# Patient Record
Sex: MALE | Race: OTHER | HISPANIC OR LATINO | URBAN - METROPOLITAN AREA
[De-identification: names, ages, dates, MRNs, and addresses within clinical notes are randomized per-mention and may not be internally consistent; named-entity substitution may affect disease eponyms.]

---

## 2017-09-21 ENCOUNTER — OUTPATIENT (OUTPATIENT)
Dept: OUTPATIENT SERVICES | Facility: HOSPITAL | Age: 62
LOS: 1 days | End: 2017-09-21
Payer: COMMERCIAL

## 2017-09-21 DIAGNOSIS — Z98.89 OTHER SPECIFIED POSTPROCEDURAL STATES: Chronic | ICD-10-CM

## 2017-09-21 PROCEDURE — 78815 PET IMAGE W/CT SKULL-THIGH: CPT

## 2017-09-21 PROCEDURE — 78815 PET IMAGE W/CT SKULL-THIGH: CPT | Mod: 26

## 2017-09-21 PROCEDURE — A9552: CPT

## 2018-06-27 ENCOUNTER — APPOINTMENT (OUTPATIENT)
Dept: NEUROLOGY | Facility: CLINIC | Age: 63
End: 2018-06-27
Payer: COMMERCIAL

## 2018-06-27 VITALS
SYSTOLIC BLOOD PRESSURE: 130 MMHG | DIASTOLIC BLOOD PRESSURE: 83 MMHG | HEART RATE: 61 BPM | OXYGEN SATURATION: 96 % | HEIGHT: 67 IN

## 2018-06-27 VITALS — BODY MASS INDEX: 31.17 KG/M2 | WEIGHT: 199 LBS

## 2018-06-27 PROCEDURE — 99203 OFFICE O/P NEW LOW 30 MIN: CPT

## 2018-06-27 RX ORDER — ALBUTEROL SULFATE 90 UG/1
108 (90 BASE) AEROSOL, METERED RESPIRATORY (INHALATION)
Qty: 18 | Refills: 0 | Status: ACTIVE | COMMUNITY
Start: 2018-02-09

## 2018-06-27 RX ORDER — TAMSULOSIN HYDROCHLORIDE 0.4 MG/1
0.4 CAPSULE ORAL
Qty: 90 | Refills: 0 | Status: ACTIVE | COMMUNITY
Start: 2018-02-21

## 2018-09-12 ENCOUNTER — LABORATORY RESULT (OUTPATIENT)
Age: 63
End: 2018-09-12

## 2018-09-12 ENCOUNTER — APPOINTMENT (OUTPATIENT)
Dept: NEUROLOGY | Facility: CLINIC | Age: 63
End: 2018-09-12
Payer: COMMERCIAL

## 2018-09-12 VITALS
DIASTOLIC BLOOD PRESSURE: 80 MMHG | SYSTOLIC BLOOD PRESSURE: 129 MMHG | HEART RATE: 63 BPM | HEIGHT: 67 IN | WEIGHT: 199 LBS | BODY MASS INDEX: 31.23 KG/M2 | OXYGEN SATURATION: 95 %

## 2018-09-12 DIAGNOSIS — R29.2 ABNORMAL REFLEX: ICD-10-CM

## 2018-09-12 PROCEDURE — 99214 OFFICE O/P EST MOD 30 MIN: CPT

## 2018-09-13 LAB
ALBUMIN MFR SERPL ELPH: 60.4 %
ALBUMIN SERPL-MCNC: 4 G/DL
ALBUMIN/GLOB SERPL: 1.5 RATIO
ALPHA1 GLOB MFR SERPL ELPH: 4.6 %
ALPHA1 GLOB SERPL ELPH-MCNC: 0.3 G/DL
ALPHA2 GLOB MFR SERPL ELPH: 9.8 %
ALPHA2 GLOB SERPL ELPH-MCNC: 0.6 G/DL
B-GLOBULIN MFR SERPL ELPH: 11.4 %
B-GLOBULIN SERPL ELPH-MCNC: 0.8 G/DL
ENA SS-A AB SER IA-ACNC: <0.2 AL
ENA SS-B AB SER IA-ACNC: <0.2 AL
ERYTHROCYTE [SEDIMENTATION RATE] IN BLOOD BY WESTERGREN METHOD: 7 MM/HR
FOLATE SERPL-MCNC: 18.8 NG/ML
GAMMA GLOB FLD ELPH-MCNC: 0.9 G/DL
GAMMA GLOB MFR SERPL ELPH: 13.8 %
INTERPRETATION SERPL IEP-IMP: NORMAL
M PROTEIN SPEC IFE-MCNC: NORMAL
PROT SERPL-MCNC: 6.6 G/DL
PROT SERPL-MCNC: 6.6 G/DL
VIT B12 SERPL-MCNC: 449 PG/ML

## 2018-09-14 LAB — HTLV I+II AB SER QL: NORMAL

## 2018-09-15 LAB
COPPER SERPL-MCNC: 124 UG/DL
ZINC SERPL-MCNC: 91 UG/DL

## 2018-09-16 LAB
A-TOCOPHEROL VIT E SERPL-MCNC: 8 MG/L
BETA+GAMMA TOCOPHEROL SERPL-MCNC: 0.7 MG/L

## 2018-09-18 LAB
HOMOCYSTEINE LEVEL: 17.9 UMOL/L
METHYLMALONIC ACID LEVEL: 183 NMOL/L

## 2018-09-26 ENCOUNTER — APPOINTMENT (OUTPATIENT)
Dept: MRI IMAGING | Facility: CLINIC | Age: 63
End: 2018-09-26
Payer: COMMERCIAL

## 2018-09-26 ENCOUNTER — OUTPATIENT (OUTPATIENT)
Dept: OUTPATIENT SERVICES | Facility: HOSPITAL | Age: 63
LOS: 1 days | End: 2018-09-26
Payer: COMMERCIAL

## 2018-09-26 DIAGNOSIS — Z98.89 OTHER SPECIFIED POSTPROCEDURAL STATES: Chronic | ICD-10-CM

## 2018-09-26 PROCEDURE — 72146 MRI CHEST SPINE W/O DYE: CPT | Mod: 26

## 2018-09-26 PROCEDURE — 72141 MRI NECK SPINE W/O DYE: CPT

## 2018-09-26 PROCEDURE — 72141 MRI NECK SPINE W/O DYE: CPT | Mod: 26

## 2018-09-26 PROCEDURE — 72146 MRI CHEST SPINE W/O DYE: CPT

## 2018-11-30 ENCOUNTER — APPOINTMENT (OUTPATIENT)
Dept: NEUROLOGY | Facility: CLINIC | Age: 63
End: 2018-11-30
Payer: COMMERCIAL

## 2018-11-30 VITALS
WEIGHT: 199 LBS | HEIGHT: 67 IN | BODY MASS INDEX: 31.23 KG/M2 | HEART RATE: 54 BPM | SYSTOLIC BLOOD PRESSURE: 126 MMHG | OXYGEN SATURATION: 96 % | DIASTOLIC BLOOD PRESSURE: 78 MMHG

## 2018-11-30 DIAGNOSIS — R25.2 CRAMP AND SPASM: ICD-10-CM

## 2018-11-30 DIAGNOSIS — M54.9 DORSALGIA, UNSPECIFIED: ICD-10-CM

## 2018-11-30 PROCEDURE — 95912 NRV CNDJ TEST 11-12 STUDIES: CPT

## 2018-11-30 PROCEDURE — 99214 OFFICE O/P EST MOD 30 MIN: CPT | Mod: 25

## 2018-11-30 PROCEDURE — 95885 MUSC TST DONE W/NERV TST LIM: CPT | Mod: 59

## 2019-01-06 ENCOUNTER — FORM ENCOUNTER (OUTPATIENT)
Age: 64
End: 2019-01-06

## 2019-01-07 ENCOUNTER — OUTPATIENT (OUTPATIENT)
Dept: OUTPATIENT SERVICES | Facility: HOSPITAL | Age: 64
LOS: 1 days | End: 2019-01-07
Payer: COMMERCIAL

## 2019-01-07 ENCOUNTER — APPOINTMENT (OUTPATIENT)
Dept: ORTHOPEDIC SURGERY | Facility: CLINIC | Age: 64
End: 2019-01-07
Payer: COMMERCIAL

## 2019-01-07 VITALS
HEIGHT: 67 IN | DIASTOLIC BLOOD PRESSURE: 80 MMHG | HEART RATE: 60 BPM | WEIGHT: 199 LBS | OXYGEN SATURATION: 97 % | SYSTOLIC BLOOD PRESSURE: 124 MMHG | BODY MASS INDEX: 31.23 KG/M2

## 2019-01-07 DIAGNOSIS — M67.911 UNSPECIFIED DISORDER OF SYNOVIUM AND TENDON, RIGHT SHOULDER: ICD-10-CM

## 2019-01-07 DIAGNOSIS — Z98.89 OTHER SPECIFIED POSTPROCEDURAL STATES: Chronic | ICD-10-CM

## 2019-01-07 DIAGNOSIS — M67.912 UNSPECIFIED DISORDER OF SYNOVIUM AND TENDON, RIGHT SHOULDER: ICD-10-CM

## 2019-01-07 PROCEDURE — 73030 X-RAY EXAM OF SHOULDER: CPT

## 2019-01-07 PROCEDURE — 73030 X-RAY EXAM OF SHOULDER: CPT | Mod: 26,50

## 2019-01-07 PROCEDURE — 99203 OFFICE O/P NEW LOW 30 MIN: CPT

## 2020-10-13 ENCOUNTER — APPOINTMENT (OUTPATIENT)
Age: 65
End: 2020-10-13
Payer: COMMERCIAL

## 2020-10-13 DIAGNOSIS — K86.9 DISEASE OF PANCREAS, UNSPECIFIED: ICD-10-CM

## 2020-10-13 DIAGNOSIS — R93.2 ABNORMAL FINDINGS ON DIAGNOSTIC IMAGING OF LIVER AND BILIARY TRACT: ICD-10-CM

## 2020-10-13 PROCEDURE — 99203 OFFICE O/P NEW LOW 30 MIN: CPT | Mod: 95

## 2020-10-14 ENCOUNTER — RESULT REVIEW (OUTPATIENT)
Age: 65
End: 2020-10-14

## 2020-10-14 ENCOUNTER — APPOINTMENT (OUTPATIENT)
Age: 65
End: 2020-10-14

## 2020-10-14 ENCOUNTER — OUTPATIENT (OUTPATIENT)
Dept: OUTPATIENT SERVICES | Facility: HOSPITAL | Age: 65
LOS: 1 days | Discharge: HOME | End: 2020-10-14
Payer: COMMERCIAL

## 2020-10-14 DIAGNOSIS — Z98.89 OTHER SPECIFIED POSTPROCEDURAL STATES: Chronic | ICD-10-CM

## 2020-10-14 PROBLEM — R93.2 ABNORMAL CT OF LIVER: Status: ACTIVE | Noted: 2020-10-14

## 2020-10-14 PROCEDURE — 88305 TISSUE EXAM BY PATHOLOGIST: CPT

## 2020-10-14 PROCEDURE — 88305 TISSUE EXAM BY PATHOLOGIST: CPT | Mod: 26

## 2020-10-14 PROCEDURE — 88173 CYTOPATH EVAL FNA REPORT: CPT

## 2020-10-14 PROCEDURE — 88341 IMHCHEM/IMCYTCHM EA ADD ANTB: CPT

## 2020-10-14 PROCEDURE — 88341 IMHCHEM/IMCYTCHM EA ADD ANTB: CPT | Mod: 26,59

## 2020-10-14 PROCEDURE — 88342 IMHCHEM/IMCYTCHM 1ST ANTB: CPT | Mod: 26,59

## 2020-10-14 PROCEDURE — 88342 IMHCHEM/IMCYTCHM 1ST ANTB: CPT

## 2020-10-14 PROCEDURE — 88360 TUMOR IMMUNOHISTOCHEM/MANUAL: CPT | Mod: 26

## 2020-10-14 PROCEDURE — 88360 TUMOR IMMUNOHISTOCHEM/MANUAL: CPT

## 2020-10-14 PROCEDURE — 43242 EGD US FINE NEEDLE BX/ASPIR: CPT

## 2020-10-14 PROCEDURE — 88173 CYTOPATH EVAL FNA REPORT: CPT | Mod: 26

## 2020-10-14 NOTE — ASSESSMENT
[FreeTextEntry1] : 64yM referred for evaluation of pancreatic lesion\par \par 1. Pancreatic lesion - As above, patient with sizeable pancreatic lesion ~5.5x4.5x3.5cm in the tail of the pancreas. Based off of imaging features and patient's history, lesion is concerning for pancreatic involvement of lymphoma, however, cannot rule out primary pancreatic malignancy.\par - Discussed r/b/a/i of endoscopic evaluation for EUS w/FNA/FNB and patient amenable\par - Discussed need for COVID test and escort\par - Plan for EUS at [facility]\par \par RTC after EUS\par Patient discussed with attending physician

## 2020-10-14 NOTE — HISTORY OF PRESENT ILLNESS
[Home] : at home, [unfilled] , at the time of the visit. [Medical Office: ([facility])___] : at the medical office located in  [Verbal consent obtained from patient] : the patient, [unfilled] [de-identified] : Patient is a 64yM who was referred for telephonic evaluation by Dr. Carlos for a pancreatic lesion. Patient has a history of stage III grade 1 follicular lymphoma, initially diagnosed in 2012, and lost to follow-up between 2018 and 2020. Patient was suffering from abdominal pain, first noted in early October, for which he went to the [facility] ED. There a CT was performed showing a 5.7x3.7cm mass in the body of the pancreas with adjacent adenopathy. A follow-up MRI was performed as an outpatient showing a T2 hyperintense, exophytic mass in the pancreatic tail, 5.8x3.5x4.4cm with heterogeneity. No appreciable internal enhancement with contrast, however, there is a peripherally enhancing nodular solid components. No pancreatic ductal dilation is seen. Several large hepatic cysts without concerning lesions seen. Overall read as concerning for pancreatic involvement of lymphoma rather than a pancreatic primary cancer, though ductal adenocarcinoma or malignant IPMN is within the differential.

## 2020-10-16 LAB
NON-GYNECOLOGICAL CYTOLOGY STUDY: SIGNIFICANT CHANGE UP
SURGICAL PATHOLOGY STUDY: SIGNIFICANT CHANGE UP

## 2020-10-28 ENCOUNTER — APPOINTMENT (OUTPATIENT)
Age: 65
End: 2020-10-28

## 2020-10-28 ENCOUNTER — OUTPATIENT (OUTPATIENT)
Dept: OUTPATIENT SERVICES | Facility: HOSPITAL | Age: 65
LOS: 1 days | Discharge: HOME | End: 2020-10-28
Payer: COMMERCIAL

## 2020-10-28 DIAGNOSIS — Z98.89 OTHER SPECIFIED POSTPROCEDURAL STATES: Chronic | ICD-10-CM

## 2020-10-28 PROCEDURE — 43242 EGD US FINE NEEDLE BX/ASPIR: CPT

## 2020-10-29 ENCOUNTER — RESULT REVIEW (OUTPATIENT)
Age: 65
End: 2020-10-29

## 2020-10-29 PROCEDURE — 88173 CYTOPATH EVAL FNA REPORT: CPT

## 2020-10-29 PROCEDURE — 88360 TUMOR IMMUNOHISTOCHEM/MANUAL: CPT | Mod: 26

## 2020-10-29 PROCEDURE — 88342 IMHCHEM/IMCYTCHM 1ST ANTB: CPT | Mod: 26,59

## 2020-10-29 PROCEDURE — 88173 CYTOPATH EVAL FNA REPORT: CPT | Mod: 26

## 2020-10-29 PROCEDURE — 43242 EGD US FINE NEEDLE BX/ASPIR: CPT

## 2020-10-29 PROCEDURE — 88305 TISSUE EXAM BY PATHOLOGIST: CPT | Mod: 26

## 2020-10-29 PROCEDURE — 88341 IMHCHEM/IMCYTCHM EA ADD ANTB: CPT

## 2020-10-29 PROCEDURE — 88360 TUMOR IMMUNOHISTOCHEM/MANUAL: CPT

## 2020-10-29 PROCEDURE — 88341 IMHCHEM/IMCYTCHM EA ADD ANTB: CPT | Mod: 26,59

## 2020-10-29 PROCEDURE — 88305 TISSUE EXAM BY PATHOLOGIST: CPT

## 2020-11-03 LAB — NON-GYNECOLOGICAL CYTOLOGY STUDY: SIGNIFICANT CHANGE UP

## 2020-11-04 DIAGNOSIS — C86.6 PRIMARY CUTANEOUS CD30-POSITIVE T-CELL PROLIFERATIONS: ICD-10-CM

## 2020-11-04 DIAGNOSIS — R93.3 ABNORMAL FINDINGS ON DIAGNOSTIC IMAGING OF OTHER PARTS OF DIGESTIVE TRACT: ICD-10-CM

## 2020-11-04 DIAGNOSIS — J45.909 UNSPECIFIED ASTHMA, UNCOMPLICATED: ICD-10-CM

## 2020-11-04 DIAGNOSIS — Z88.6 ALLERGY STATUS TO ANALGESIC AGENT: ICD-10-CM

## 2020-11-04 DIAGNOSIS — G47.33 OBSTRUCTIVE SLEEP APNEA (ADULT) (PEDIATRIC): ICD-10-CM

## 2020-11-04 DIAGNOSIS — N40.0 BENIGN PROSTATIC HYPERPLASIA WITHOUT LOWER URINARY TRACT SYMPTOMS: ICD-10-CM

## 2020-11-04 DIAGNOSIS — R00.9 UNSPECIFIED ABNORMALITIES OF HEART BEAT: ICD-10-CM

## 2020-11-04 DIAGNOSIS — K86.9 DISEASE OF PANCREAS, UNSPECIFIED: ICD-10-CM

## 2021-02-10 ENCOUNTER — APPOINTMENT (OUTPATIENT)
Dept: CT IMAGING | Facility: HOSPITAL | Age: 66
End: 2021-02-10

## 2021-02-10 ENCOUNTER — APPOINTMENT (OUTPATIENT)
Dept: INTERVENTIONAL RADIOLOGY/VASCULAR | Facility: HOSPITAL | Age: 66
End: 2021-02-10

## 2022-03-28 ENCOUNTER — APPOINTMENT (OUTPATIENT)
Dept: SURGICAL ONCOLOGY | Facility: CLINIC | Age: 67
End: 2022-03-28
Payer: MEDICARE

## 2022-03-28 ENCOUNTER — RESULT REVIEW (OUTPATIENT)
Age: 67
End: 2022-03-28

## 2022-03-28 VITALS
SYSTOLIC BLOOD PRESSURE: 156 MMHG | BODY MASS INDEX: 32.62 KG/M2 | HEIGHT: 66 IN | DIASTOLIC BLOOD PRESSURE: 86 MMHG | TEMPERATURE: 97.8 F | OXYGEN SATURATION: 94 % | HEART RATE: 56 BPM | WEIGHT: 203 LBS

## 2022-03-28 PROCEDURE — 99204 OFFICE O/P NEW MOD 45 MIN: CPT

## 2022-03-28 RX ORDER — CYCLOBENZAPRINE HYDROCHLORIDE 5 MG/1
5 TABLET, FILM COATED ORAL
Qty: 30 | Refills: 3 | Status: DISCONTINUED | COMMUNITY
Start: 2018-10-01 | End: 2022-03-28

## 2022-03-28 RX ORDER — OSELTAMIVIR PHOSPHATE 75 MG/1
75 CAPSULE ORAL
Qty: 10 | Refills: 0 | Status: DISCONTINUED | COMMUNITY
Start: 2018-02-09 | End: 2022-03-28

## 2022-03-28 RX ORDER — OXYCODONE AND ACETAMINOPHEN 5; 325 MG/1; MG/1
5-325 TABLET ORAL
Qty: 14 | Refills: 0 | Status: DISCONTINUED | COMMUNITY
Start: 2018-04-12 | End: 2022-03-28

## 2022-03-28 NOTE — RESULTS/DATA
[FreeTextEntry1] : Diagnostic Studies\par \par Date: 3/10/22\par Study: CT Chest and CT AP ([facility])\par Results: 1) Significant progression of disease with increasing/new adenopathy\par 2) Enlarging liver cyst. This has been enlarging gradually since 2014, atypical for congenital cyst. There is no evidence of mural calcification or and a cyst to suggest echinococcus based on imaging\par 3) Small ventral hernia containing fat\par 4) Enlarged prostate with nondistended urinary bladder\par \par \par

## 2022-03-28 NOTE — ASSESSMENT
[FreeTextEntry1] : The patient is a 66 y.o. man who has been referred to my clinic for an enlarging liver cyst.\par My review of his recent CT (3/10/22) does not reveal any sign concerning for a cystic malignancy of the liver. Specifically, there are no nodules, thick septations, or areas of hyperenhancement. \par The CT does show however, significant retroperitoneal lymphadenopathy, which has enlarged compared to prior studies. \par I am concerned about recurrent lymphoma.\par Will order a PET CT and discuss with Dr. Mckay. The PET-CT, mainly obtained for evaluation of the lymphadenopathy, will also be useful for additional evaluation of the liver cyst.

## 2022-03-28 NOTE — PHYSICAL EXAM
[Normal Neck Lymph Nodes] : normal neck lymph nodes  [Normal Supraclavicular Lymph Nodes] : normal supraclavicular lymph nodes [Normal] : oriented to person, place and time, with appropriate affect [de-identified] : Anicteric [de-identified] : S1,S2, regular rate and rhythm. No murmurs heard. [de-identified] : Clear throughout. No wheezes heard. [de-identified] : warm, dry

## 2022-03-29 LAB
ANION GAP SERPL CALC-SCNC: 10 MMOL/L
BUN SERPL-MCNC: 14 MG/DL
CALCIUM SERPL-MCNC: 9.6 MG/DL
CHLORIDE SERPL-SCNC: 110 MMOL/L
CO2 SERPL-SCNC: 27 MMOL/L
CREAT SERPL-MCNC: 1.16 MG/DL
EGFR: 69 ML/MIN/1.73M2
GLUCOSE SERPL-MCNC: 98 MG/DL
POTASSIUM SERPL-SCNC: 5 MMOL/L
SODIUM SERPL-SCNC: 147 MMOL/L

## 2022-03-30 ENCOUNTER — OUTPATIENT (OUTPATIENT)
Dept: OUTPATIENT SERVICES | Facility: HOSPITAL | Age: 67
LOS: 1 days | End: 2022-03-30
Payer: MEDICARE

## 2022-03-30 DIAGNOSIS — Z98.89 OTHER SPECIFIED POSTPROCEDURAL STATES: Chronic | ICD-10-CM

## 2022-03-30 PROCEDURE — 82962 GLUCOSE BLOOD TEST: CPT

## 2022-03-30 PROCEDURE — A9552: CPT

## 2022-03-30 PROCEDURE — 78815 PET IMAGE W/CT SKULL-THIGH: CPT

## 2022-03-30 PROCEDURE — 78815 PET IMAGE W/CT SKULL-THIGH: CPT | Mod: 26,PI,MH

## 2022-04-23 ENCOUNTER — LABORATORY RESULT (OUTPATIENT)
Age: 67
End: 2022-04-23

## 2022-04-27 ENCOUNTER — OUTPATIENT (OUTPATIENT)
Dept: OUTPATIENT SERVICES | Facility: HOSPITAL | Age: 67
LOS: 1 days | End: 2022-04-27
Payer: MEDICARE

## 2022-04-27 ENCOUNTER — APPOINTMENT (OUTPATIENT)
Dept: INTERVENTIONAL RADIOLOGY/VASCULAR | Facility: HOSPITAL | Age: 67
End: 2022-04-27
Payer: MEDICARE

## 2022-04-27 ENCOUNTER — RESULT REVIEW (OUTPATIENT)
Age: 67
End: 2022-04-27

## 2022-04-27 DIAGNOSIS — Z98.89 OTHER SPECIFIED POSTPROCEDURAL STATES: Chronic | ICD-10-CM

## 2022-04-27 PROCEDURE — 88341 IMHCHEM/IMCYTCHM EA ADD ANTB: CPT | Mod: 26,59

## 2022-04-27 PROCEDURE — 88360 TUMOR IMMUNOHISTOCHEM/MANUAL: CPT | Mod: 26

## 2022-04-27 PROCEDURE — 38505 NEEDLE BIOPSY LYMPH NODES: CPT

## 2022-04-27 PROCEDURE — 76942 ECHO GUIDE FOR BIOPSY: CPT | Mod: 26

## 2022-04-27 PROCEDURE — 88342 IMHCHEM/IMCYTCHM 1ST ANTB: CPT | Mod: 26,59

## 2022-04-28 ENCOUNTER — RESULT REVIEW (OUTPATIENT)
Age: 67
End: 2022-04-28

## 2022-04-28 PROCEDURE — 88360 TUMOR IMMUNOHISTOCHEM/MANUAL: CPT

## 2022-04-28 PROCEDURE — 88305 TISSUE EXAM BY PATHOLOGIST: CPT | Mod: 26

## 2022-04-28 PROCEDURE — 88341 IMHCHEM/IMCYTCHM EA ADD ANTB: CPT

## 2022-04-28 PROCEDURE — 38505 NEEDLE BIOPSY LYMPH NODES: CPT

## 2022-04-28 PROCEDURE — 88173 CYTOPATH EVAL FNA REPORT: CPT | Mod: 26

## 2022-04-28 PROCEDURE — 88342 IMHCHEM/IMCYTCHM 1ST ANTB: CPT

## 2022-04-28 PROCEDURE — 88305 TISSUE EXAM BY PATHOLOGIST: CPT

## 2022-04-28 PROCEDURE — 88173 CYTOPATH EVAL FNA REPORT: CPT

## 2022-04-28 PROCEDURE — 76942 ECHO GUIDE FOR BIOPSY: CPT

## 2022-04-29 LAB — NON-GYNECOLOGICAL CYTOLOGY STUDY: SIGNIFICANT CHANGE UP

## 2022-05-02 LAB — SURGICAL PATHOLOGY STUDY: SIGNIFICANT CHANGE UP

## 2022-06-03 ENCOUNTER — APPOINTMENT (OUTPATIENT)
Dept: SURGERY | Facility: CLINIC | Age: 67
End: 2022-06-03
Payer: MEDICARE

## 2022-06-03 VITALS
TEMPERATURE: 96.9 F | WEIGHT: 185.5 LBS | SYSTOLIC BLOOD PRESSURE: 127 MMHG | HEART RATE: 83 BPM | HEIGHT: 66 IN | BODY MASS INDEX: 29.81 KG/M2 | DIASTOLIC BLOOD PRESSURE: 87 MMHG | OXYGEN SATURATION: 99 %

## 2022-06-03 PROCEDURE — 99203 OFFICE O/P NEW LOW 30 MIN: CPT

## 2022-06-16 NOTE — ASSESSMENT
[FreeTextEntry1] : 65 y/o male with pmhx of stage III grade 1 follicular lymphoma. Small ventral hernia, asymptomatic. It appears his follicular cancer may be progressing. He is hoping to get another opinion from hematologist. Any treatment of this would supercede need for elective hernia repair.\par \par Referral provided to hematology, .

## 2022-06-16 NOTE — HISTORY OF PRESENT ILLNESS
[de-identified] : This is a 65 y/o male with  presenting to the office for evaluation and management of a ventral hernia. Reports he underwent CT scan which he was dx with ventral hernia. Denies any abdominal pain or discomfort. Denies any change in hernia size. Reports intermittent episodes of constipation in the past 3 weeks. Denies any fever, chills, nausea, vomiting. Of note, Pmxh of stage III grade 1 follicular lymphoma (previously followed by , now followed Dr.Ali Mckay).

## 2022-06-16 NOTE — DATA REVIEWED
[FreeTextEntry1] : \par 3/10/2022: EXAM:  CT CHEST WITH CONTRAST AND CT ABDOMEN AND PELVIS WITHOUT AND WITH CONTRAST\par \par HISTORY:  66-year-old male former smoker with: C 82.95-follicular lymphoma\par COMPARISON:  PET/CT 11/6/2020 and noncontrast chest CT 2/10/2015.\par FINDINGS:  \par \par A 4 mm polygonal nodule in the superior segment of the left lower lobe, image 11/63, is stable since 2015 consistent with benignity. The lung fields are otherwise clear. No pleural effusions. The trachea and mainstem bronchi are patent. There is no significant mediastinal or hilar adenopathy. The heart and great vessels appear unremarkable. Thoracic esophagus nondilated.\par \par Adenopathy includes:\par Celiac axis, 5/82, 8.7 x 4.1 cm, previously 5.4 x 3.4 cm. (Central necrosis no longer visualized).\par Left para-aortic, 5/96, 3.7 x 5.0 cm, previously 1.3 x 2.8 cm.\par Left retroperitoneal adenopathy, 5/107, 2.0 x 3.0 cm, previously 2.2 x 2.5 cm.\par Left axilla, 1.8 x 2.1 cm, new Right retrocaval adenopathy, 5/101, 4.1 x 4.6 cm, new.\par Left retroperitoneal adenopathy, 5/114, 3.3 x 4.5 cm, previously 2.0 x 2.9 cm.\par Right interaortocaval, 5/115, 3.3 x 4.0 cm, not seen previously.\par \par A large cyst with lobulated contours, centrally in the right lobe of the liver now measures 10.3 cm in diameter, previously 8.7 cm. Other smaller cysts are stable. The spleen, pancreas, adrenal glands, kidneys, abdominal aorta, and gallbladder appear unremarkable. There is moderate colonic diverticulosis. No dilated bowel loops are evident. There is no ascites or intraperitoneal free air. The prostate is enlarged, measuring 6.4 cm in diameter. The urinary bladder is nondistended. There are small bilateral inguinal hernias containing fat. There is a small left ventral hernia containing fat, with a 9 mm defect, image 9/73. Flowing osteophytes in the thoracic spine consistent with diffuse idiopathic skeletal hypertrophy. Degenerative disc changes are evident in the lumbar spine.\par \par IMPRESSION:  \par 1. Significant progression of disease with increasing/new adenopathy.\par 2. Enlarging liver cyst. This has been enlarging gradually since 2014, atypical for a congenital cyst. There is no evidence of mural calcification or and a cyst to suggest echinococcus based on imaging.\par 3. Small ventral hernia containing fat.\par 4. Enlarged prostate with nondistended urinary bladder.\par \par \par PET/CT/ SKULL to Thigh Onc- PROCEDURE DATE:  03/30/2022\par INTERPRETATION:  PET-CT Scan\par \par History: Rule out recurrent lymphoma. Restaging to help determine subsequent treatment strategy.\par Comparison: Comparison made with most recent CT scan of chest, abdomen and pelvis from 3/10/2022, CT scan of abdomen and pelvis from 10/3/2020, and with PET/CT from 9/21/2017.\par \par Findings:\par \par Head and neck: There are hypermetabolic 1.0 cm (SUV 8.7) and 0.8 cm (SUV 5.5) lymph nodes in station 1B of the right neck. 1.1 cm hypermetabolic lymph node station 2B of right neck, SUV 11.9. A 2.0 cm left supraclavicular lymph node is also hypermetabolic, SUV 11.7.\par \par Lungs and large airways: Normal.\par Pleura:  No pleural effusion.\par Mediastinum, hilar, and axillary regions: There are multiple enlarged, hypermetabolic axillary lymph nodes bilaterally, the largest on the left measuring 2.1 cm with SUV of 14.0 and the largest on the right measuring 1.4 cm with SUV of 13.3.\par \par Heart and pericardium:  Heart size is enlarged. No pericardial effusion.\par Vessels:  Mild calcified plaque aorta.\par Liver:  There are a few hepatic cysts, the largest measuring 10.2 cm.\par \par Gallbladder: No radiopaque stones gallbladder.\par Spleen:  Normal.\par Pancreas:  Normal.\par Adrenal glands:  Normal.\par Kidneys: Normal.\par \par Abdominal and pelvic adenopathy:  Large hypermetabolic retroperitoneal and upper abdominal lymph nodes. For instance, the largest lymph node in the gastrohepatic ligament measures 2.0 cm and has SUV of 17.3. Hypermetabolic periportal and periceliac lymph nodes are also present. A 3.7 cm retroperitoneal lymph node in the left periaortic region has SUV of 17.5. Confluent lymph nodes in the retrocaval region which encases the right renal artery measure 3.2 cm with SUV of 17.0 1/8/2025. There is increased infiltration of the mesenteric and retroperitoneal fat.\par \par Ascites: Trace pelvic ascites.\par Gastrointestinal tract: Colonic diverticula are present.\par Pelvic organs: Large prostate. Bladder unremarkable.\par Soft tissues: Elevated right hemidiaphragm. Small fat-containing supraumbilical ventral hernia.\par \par Bones: Degenerative changes of the spine.\par \par \par Impression: There are hypermetabolic lymph nodes in the neck, both axillary regions, upper abdomen and retroperitoneum, consistent with recurrent lymphoma.

## 2022-06-16 NOTE — PHYSICAL EXAM
[Oriented to Person] : oriented to person [Oriented to Place] : oriented to place [Oriented to Time] : oriented to time [Calm] : calm [Abdominal Masses] : No abdominal masses [Abdomen Tenderness] : ~T ~M No abdominal tenderness [Tender] : was nontender [Enlarged] : not enlarged [de-identified] : NAD, comfortable [de-identified] : Normocephalic, atraumatic. No scleral icterus.  [de-identified] : Supple, no JVD or cervical lymphadenopathy.  [de-identified] : No respiratory distress.  [de-identified] : Abdomen is soft, non tender and non distended. Do not appreciate any overt mass. There is a small ventral hernia. Non tender.

## 2022-06-29 ENCOUNTER — APPOINTMENT (OUTPATIENT)
Dept: HEMATOLOGY ONCOLOGY | Facility: CLINIC | Age: 67
End: 2022-06-29

## 2022-06-29 VITALS
HEIGHT: 66 IN | OXYGEN SATURATION: 97 % | BODY MASS INDEX: 28.77 KG/M2 | HEART RATE: 109 BPM | DIASTOLIC BLOOD PRESSURE: 90 MMHG | SYSTOLIC BLOOD PRESSURE: 122 MMHG | WEIGHT: 179 LBS | TEMPERATURE: 95.1 F

## 2022-06-29 PROCEDURE — 36415 COLL VENOUS BLD VENIPUNCTURE: CPT

## 2022-06-29 PROCEDURE — 99204 OFFICE O/P NEW MOD 45 MIN: CPT | Mod: 25

## 2022-06-29 RX ORDER — SERTRALINE HYDROCHLORIDE 100 MG/1
100 TABLET, FILM COATED ORAL
Qty: 90 | Refills: 0 | Status: COMPLETED | COMMUNITY
Start: 2018-02-21 | End: 2022-06-29

## 2022-06-29 NOTE — ASSESSMENT
[FreeTextEntry1] : 66 years old with longstanding follicular lymphoma...\par \par No data available in our system to decide if patient needs treatment at this point in time...\par \par I discussed this with patient in detail and he is going to [facility] to get his records and bring it to us ASAP...\par \par Once results available, I will hopefully be able to render an opinion regarding the need for treatment for his lymphoma at present time.

## 2022-06-29 NOTE — HISTORY OF PRESENT ILLNESS
[de-identified] : 66 years old male history of follicular lymphoma on no treatment since 2018... He was initially followed by Dr. Potter 2/80 and later on by Dr. Gallagher... Last week he was admitted to [facility] where they offered him chemotherapy however he declined... He is here for a second opinion but does not have his records...\par \par I reviewed his records in the [facility], we do not even have a CBC on the patient..

## 2022-07-05 ENCOUNTER — APPOINTMENT (OUTPATIENT)
Dept: HEMATOLOGY ONCOLOGY | Facility: CLINIC | Age: 67
End: 2022-07-05

## 2022-07-05 VITALS
HEIGHT: 66 IN | BODY MASS INDEX: 28.12 KG/M2 | OXYGEN SATURATION: 97 % | HEART RATE: 104 BPM | TEMPERATURE: 97 F | SYSTOLIC BLOOD PRESSURE: 125 MMHG | WEIGHT: 175 LBS | DIASTOLIC BLOOD PRESSURE: 82 MMHG

## 2022-07-05 PROCEDURE — 99215 OFFICE O/P EST HI 40 MIN: CPT

## 2022-07-05 NOTE — REASON FOR VISIT
[Initial Consultation] : an initial consultation for [Lymphoma] : lymphoma [Lymphoproliferative Disorder] : Lymphoproliferative disorder

## 2022-07-06 RX ORDER — ONDANSETRON 4 MG/1
4 TABLET ORAL
Qty: 9 | Refills: 0 | Status: ACTIVE | COMMUNITY
Start: 2022-03-09

## 2022-07-06 RX ORDER — GABAPENTIN 300 MG/1
300 CAPSULE ORAL
Qty: 20 | Refills: 0 | Status: ACTIVE | COMMUNITY
Start: 2022-06-02

## 2022-07-06 RX ORDER — LEVALBUTEROL TARTRATE 45 UG/1
45 AEROSOL, METERED ORAL
Qty: 15 | Refills: 0 | Status: ACTIVE | COMMUNITY
Start: 2022-05-25

## 2022-07-06 RX ORDER — FAMOTIDINE 20 MG/1
20 TABLET, FILM COATED ORAL
Qty: 28 | Refills: 0 | Status: ACTIVE | COMMUNITY
Start: 2022-03-09

## 2022-07-06 RX ORDER — MIRTAZAPINE 15 MG/1
15 TABLET, FILM COATED ORAL
Qty: 30 | Refills: 0 | Status: ACTIVE | COMMUNITY
Start: 2022-06-15

## 2022-07-06 RX ORDER — UMECLIDINIUM BROMIDE AND VILANTEROL TRIFENATATE 62.5; 25 UG/1; UG/1
62.5-25 POWDER RESPIRATORY (INHALATION)
Qty: 60 | Refills: 0 | Status: ACTIVE | COMMUNITY
Start: 2022-06-15

## 2022-07-06 RX ORDER — POLYETHYLENE GLYCOL-3350 AND ELECTROLYTES WITH FLAVOR PACK 240; 5.84; 2.98; 6.72; 22.72 G/278.26G; G/278.26G; G/278.26G; G/278.26G; G/278.26G
240 POWDER, FOR SOLUTION ORAL
Qty: 4000 | Refills: 0 | Status: ACTIVE | COMMUNITY
Start: 2022-06-17

## 2022-07-13 RX ORDER — ONDANSETRON 8 MG/1
16 TABLET, FILM COATED ORAL ONCE
Refills: 0 | Status: DISCONTINUED | OUTPATIENT
Start: 2022-07-18 | End: 2022-07-18

## 2022-07-13 RX ORDER — BENDAMUSTINE HYDROCHLORIDE 100 MG/20ML
130 INJECTION, POWDER, LYOPHILIZED, FOR SOLUTION INTRAVENOUS ONCE
Refills: 0 | Status: DISCONTINUED | OUTPATIENT
Start: 2022-07-18 | End: 2022-07-18

## 2022-07-13 RX ORDER — RITUXIMAB 10 MG/ML
700 INJECTION, SOLUTION INTRAVENOUS ONCE
Refills: 0 | Status: DISCONTINUED | OUTPATIENT
Start: 2022-07-18 | End: 2022-07-18

## 2022-07-13 RX ORDER — DEXAMETHASONE 0.5 MG/5ML
20 ELIXIR ORAL ONCE
Refills: 0 | Status: DISCONTINUED | OUTPATIENT
Start: 2022-07-18 | End: 2022-07-18

## 2022-07-13 RX ORDER — ACETAMINOPHEN 500 MG
500 TABLET ORAL ONCE
Refills: 0 | Status: DISCONTINUED | OUTPATIENT
Start: 2022-07-18 | End: 2022-07-18

## 2022-07-18 ENCOUNTER — OUTPATIENT (OUTPATIENT)
Dept: OUTPATIENT SERVICES | Facility: HOSPITAL | Age: 67
LOS: 1 days | End: 2022-07-18
Payer: MEDICARE

## 2022-07-18 ENCOUNTER — APPOINTMENT (OUTPATIENT)
Dept: INFUSION THERAPY | Facility: CLINIC | Age: 67
End: 2022-07-18

## 2022-07-18 DIAGNOSIS — Z98.89 OTHER SPECIFIED POSTPROCEDURAL STATES: Chronic | ICD-10-CM

## 2022-07-18 DIAGNOSIS — C84.70 ANAPLASTIC LARGE CELL LYMPHOMA, ALK-NEGATIVE, UNSPECIFIED SITE: ICD-10-CM

## 2022-07-18 LAB — SARS-COV-2 RNA SPEC QL NAA+PROBE: POSITIVE

## 2022-07-18 PROCEDURE — 36415 COLL VENOUS BLD VENIPUNCTURE: CPT

## 2022-07-18 PROCEDURE — U0003: CPT

## 2022-07-19 ENCOUNTER — APPOINTMENT (OUTPATIENT)
Dept: INFUSION THERAPY | Facility: CLINIC | Age: 67
End: 2022-07-19

## 2022-07-21 ENCOUNTER — APPOINTMENT (OUTPATIENT)
Dept: HEMATOLOGY ONCOLOGY | Facility: CLINIC | Age: 67
End: 2022-07-21

## 2022-07-21 PROCEDURE — 99214 OFFICE O/P EST MOD 30 MIN: CPT | Mod: 95

## 2022-07-21 NOTE — HISTORY OF PRESENT ILLNESS
[de-identified] : 66 years old male history of follicular lymphoma on no treatment since 2018... He was initially followed by Dr. Carlos and later on by Dr. Gallagher... Last week he was admitted to [facility] [place] where they offered him chemotherapy however he declined... He is here for a second opinion but does not have his records...\par \par I reviewed his records in the [facility], we do not even have a CBC on the patient.. [de-identified] : July 5, 2022 patient returns for follow-up bringing his records from [facility] as well as CD-ROM's...

## 2022-07-21 NOTE — HISTORY OF PRESENT ILLNESS
[de-identified] : 66 years old male history of follicular lymphoma on no treatment since 2018... He was initially followed by Dr. Carlos and later on by Dr. Gallagher... Last week he was admitted to [facility] where they offered him chemotherapy however he declined... He is here for a second opinion but does not have his records...\par \par I reviewed his records in the [facility], we do not even have a CBC on the patient.. [de-identified] : July 5, 2022 patient returns for follow-up bringing his records from [facility] as well as CD-ROM's...\par \par July 21, 2022 patient caught COVID last week he was unable to receive his first dose of chemotherapy with Bendamustine and rituximab... He is feeling better now however he has not retested himself to see if he is COVID-negative.

## 2022-07-21 NOTE — ASSESSMENT
[FreeTextEntry1] : 66 years old with longstanding follicular lymphoma... Since 2012... Most recently he had gross of the intra-abdominal lymph nodes as well as peripheral lymph nodes...\par \par He was now interested in chemotherapy for his follicular lymphoma, however he missed his first appointment because of COVID infection.\par \par At this point the plan is for patient to have repeat blood work in our office, and repeat COVID testing and once those results are available we will reorder patient's chemotherapy.\par \par

## 2022-07-21 NOTE — ASSESSMENT
[FreeTextEntry1] : 66 years old with longstanding follicular lymphoma...\par \par I reviewed patient's records from [facility]...\par \par He has progression of the follicular lymphoma and at this point requires treatment... All this discussed with patient in detail and he is now interested in treatment...\par \par Patient was given written information from up-to-date about treatment with rituximab and Bendamustine... We will try to obtain authorization from his insurance for outpatient chemo... Patient aware of all side effects of chemotherapy including severe shortness of breath, rigors fevers and even death...\par \par Patient signed consent for chemotherapy... We will schedule for chemotherapy when we obtain authorization from his insurance.

## 2022-07-29 ENCOUNTER — APPOINTMENT (OUTPATIENT)
Dept: INFUSION THERAPY | Facility: CLINIC | Age: 67
End: 2022-07-29

## 2022-08-04 ENCOUNTER — APPOINTMENT (OUTPATIENT)
Dept: INFUSION THERAPY | Facility: CLINIC | Age: 67
End: 2022-08-04

## 2022-08-04 ENCOUNTER — OUTPATIENT (OUTPATIENT)
Dept: OUTPATIENT SERVICES | Facility: HOSPITAL | Age: 67
LOS: 1 days | End: 2022-08-04
Payer: MEDICARE

## 2022-08-04 VITALS
OXYGEN SATURATION: 99 % | RESPIRATION RATE: 18 BRPM | HEART RATE: 73 BPM | TEMPERATURE: 98 F | DIASTOLIC BLOOD PRESSURE: 74 MMHG | SYSTOLIC BLOOD PRESSURE: 115 MMHG

## 2022-08-04 VITALS
SYSTOLIC BLOOD PRESSURE: 116 MMHG | RESPIRATION RATE: 18 BRPM | OXYGEN SATURATION: 96 % | TEMPERATURE: 98 F | HEART RATE: 94 BPM | DIASTOLIC BLOOD PRESSURE: 79 MMHG

## 2022-08-04 DIAGNOSIS — Z98.89 OTHER SPECIFIED POSTPROCEDURAL STATES: Chronic | ICD-10-CM

## 2022-08-04 DIAGNOSIS — C84.70 ANAPLASTIC LARGE CELL LYMPHOMA, ALK-NEGATIVE, UNSPECIFIED SITE: ICD-10-CM

## 2022-08-04 LAB
25(OH)D3 SERPL-MCNC: 17.2 NG/ML
ALBUMIN MFR SERPL ELPH: 50.2 %
ALBUMIN SERPL ELPH-MCNC: 3.9 G/DL
ALBUMIN SERPL-MCNC: 3 G/DL
ALBUMIN/GLOB SERPL: 1 RATIO
ALP BLD-CCNC: 59 U/L
ALPHA1 GLOB MFR SERPL ELPH: 9.2 %
ALPHA1 GLOB SERPL ELPH-MCNC: 0.6 G/DL
ALPHA2 GLOB MFR SERPL ELPH: 16.6 %
ALPHA2 GLOB SERPL ELPH-MCNC: 1 G/DL
ALT SERPL-CCNC: 9 U/L
ANION GAP SERPL CALC-SCNC: 15 MMOL/L
AST SERPL-CCNC: 11 U/L
B-GLOBULIN MFR SERPL ELPH: 12.8 %
B-GLOBULIN SERPL ELPH-MCNC: 0.8 G/DL
B2 MICROGLOB SERPL-MCNC: 4.2 MG/L
BASOPHILS # BLD AUTO: 0.06 K/UL
BASOPHILS NFR BLD AUTO: 0.6 %
BILIRUB SERPL-MCNC: 0.4 MG/DL
BUN SERPL-MCNC: 15 MG/DL
CALCIUM SERPL-MCNC: 9.3 MG/DL
CHLORIDE SERPL-SCNC: 105 MMOL/L
CO2 SERPL-SCNC: 24 MMOL/L
CREAT SERPL-MCNC: 0.96 MG/DL
DEPRECATED KAPPA LC FREE/LAMBDA SER: 1.56 RATIO
EGFR: 86 ML/MIN/1.73M2
EOSINOPHIL # BLD AUTO: 0.15 K/UL
EOSINOPHIL NFR BLD AUTO: 1.4 %
ERYTHROCYTE [SEDIMENTATION RATE] IN BLOOD BY WESTERGREN METHOD: 55 MM/HR
GAMMA GLOB FLD ELPH-MCNC: 0.7 G/DL
GAMMA GLOB MFR SERPL ELPH: 11.2 %
GLUCOSE SERPL-MCNC: 92 MG/DL
HCT VFR BLD CALC: 36.4 %
HGB BLD-MCNC: 10.8 G/DL
IGA SER QL IEP: 146 MG/DL
IGG SER QL IEP: 686 MG/DL
IGM SER QL IEP: 44 MG/DL
IMM GRANULOCYTES NFR BLD AUTO: 0.4 %
INTERPRETATION SERPL IEP-IMP: NORMAL
KAPPA LC CSF-MCNC: 2.62 MG/DL
KAPPA LC SERPL-MCNC: 4.08 MG/DL
LDH SERPL-CCNC: 325 U/L
LYMPHOCYTES # BLD AUTO: 1.67 K/UL
LYMPHOCYTES NFR BLD AUTO: 16.1 %
M PROTEIN SPEC IFE-MCNC: NORMAL
MAN DIFF?: NORMAL
MCHC RBC-ENTMCNC: 24.5 PG
MCHC RBC-ENTMCNC: 29.7 GM/DL
MCV RBC AUTO: 82.5 FL
MONOCYTES # BLD AUTO: 1.16 K/UL
MONOCYTES NFR BLD AUTO: 11.2 %
NEUTROPHILS # BLD AUTO: 7.31 K/UL
NEUTROPHILS NFR BLD AUTO: 70.3 %
PLATELET # BLD AUTO: 428 K/UL
POTASSIUM SERPL-SCNC: 4.1 MMOL/L
PROT SERPL-MCNC: 6 G/DL
RBC # BLD: 4.41 M/UL
RBC # FLD: 16.2 %
SARS-COV-2 N GENE NPH QL NAA+PROBE: NOT DETECTED
SODIUM SERPL-SCNC: 144 MMOL/L
VIT B12 SERPL-MCNC: 1007 PG/ML
WBC # FLD AUTO: 10.39 K/UL

## 2022-08-04 PROCEDURE — 96415 CHEMO IV INFUSION ADDL HR: CPT

## 2022-08-04 PROCEDURE — 96413 CHEMO IV INFUSION 1 HR: CPT

## 2022-08-04 RX ORDER — BENDAMUSTINE HYDROCHLORIDE 100 MG/20ML
130 INJECTION, POWDER, LYOPHILIZED, FOR SOLUTION INTRAVENOUS ONCE
Refills: 0 | Status: COMPLETED | OUTPATIENT
Start: 2022-08-04 | End: 2022-08-04

## 2022-08-04 RX ORDER — ACETAMINOPHEN 500 MG
500 TABLET ORAL ONCE
Refills: 0 | Status: COMPLETED | OUTPATIENT
Start: 2022-08-04 | End: 2022-08-04

## 2022-08-04 RX ORDER — RITUXIMAB 10 MG/ML
700 INJECTION, SOLUTION INTRAVENOUS ONCE
Refills: 0 | Status: COMPLETED | OUTPATIENT
Start: 2022-08-04 | End: 2022-08-04

## 2022-08-04 RX ORDER — ONDANSETRON 8 MG/1
16 TABLET, FILM COATED ORAL ONCE
Refills: 0 | Status: COMPLETED | OUTPATIENT
Start: 2022-08-04 | End: 2022-08-04

## 2022-08-04 RX ORDER — DEXAMETHASONE 0.5 MG/5ML
20 ELIXIR ORAL ONCE
Refills: 0 | Status: COMPLETED | OUTPATIENT
Start: 2022-08-04 | End: 2022-08-04

## 2022-08-04 RX ORDER — ACETAMINOPHEN 500 MG
500 TABLET ORAL ONCE
Refills: 0 | Status: COMPLETED | OUTPATIENT
Start: 2022-09-15 | End: 2022-09-15

## 2022-08-04 RX ORDER — DEXAMETHASONE 0.5 MG/5ML
20 ELIXIR ORAL ONCE
Refills: 0 | Status: COMPLETED | OUTPATIENT
Start: 2022-09-15 | End: 2022-09-15

## 2022-08-04 RX ORDER — BENDAMUSTINE HYDROCHLORIDE 100 MG/20ML
130 INJECTION, POWDER, LYOPHILIZED, FOR SOLUTION INTRAVENOUS ONCE
Refills: 0 | Status: COMPLETED | OUTPATIENT
Start: 2022-08-25 | End: 2022-08-25

## 2022-08-04 RX ADMIN — Medication 500 MILLIGRAM(S): at 11:20

## 2022-08-04 RX ADMIN — RITUXIMAB 700 MILLIGRAM(S): 10 INJECTION, SOLUTION INTRAVENOUS at 15:00

## 2022-08-04 RX ADMIN — ONDANSETRON 232 MILLIGRAM(S): 8 TABLET, FILM COATED ORAL at 15:05

## 2022-08-04 RX ADMIN — RITUXIMAB 700 MILLIGRAM(S): 10 INJECTION, SOLUTION INTRAVENOUS at 11:00

## 2022-08-04 RX ADMIN — Medication 20 MILLIGRAM(S): at 10:50

## 2022-08-04 RX ADMIN — BENDAMUSTINE HYDROCHLORIDE 130 MILLIGRAM(S): 100 INJECTION, POWDER, LYOPHILIZED, FOR SOLUTION INTRAVENOUS at 15:30

## 2022-08-04 RX ADMIN — ONDANSETRON 16 MILLIGRAM(S): 8 TABLET, FILM COATED ORAL at 15:20

## 2022-08-04 RX ADMIN — Medication 500 MILLIGRAM(S): at 10:50

## 2022-08-04 RX ADMIN — BENDAMUSTINE HYDROCHLORIDE 130 MILLIGRAM(S): 100 INJECTION, POWDER, LYOPHILIZED, FOR SOLUTION INTRAVENOUS at 15:20

## 2022-08-05 ENCOUNTER — APPOINTMENT (OUTPATIENT)
Dept: INFUSION THERAPY | Facility: CLINIC | Age: 67
End: 2022-08-05

## 2022-08-05 ENCOUNTER — OUTPATIENT (OUTPATIENT)
Dept: OUTPATIENT SERVICES | Facility: HOSPITAL | Age: 67
LOS: 1 days | End: 2022-08-05
Payer: MEDICARE

## 2022-08-05 VITALS
RESPIRATION RATE: 18 BRPM | DIASTOLIC BLOOD PRESSURE: 64 MMHG | HEART RATE: 82 BPM | SYSTOLIC BLOOD PRESSURE: 122 MMHG | TEMPERATURE: 98 F | OXYGEN SATURATION: 98 %

## 2022-08-05 VITALS
TEMPERATURE: 98 F | HEART RATE: 78 BPM | OXYGEN SATURATION: 97 % | DIASTOLIC BLOOD PRESSURE: 65 MMHG | SYSTOLIC BLOOD PRESSURE: 116 MMHG | RESPIRATION RATE: 18 BRPM

## 2022-08-05 DIAGNOSIS — C84.70 ANAPLASTIC LARGE CELL LYMPHOMA, ALK-NEGATIVE, UNSPECIFIED SITE: ICD-10-CM

## 2022-08-05 DIAGNOSIS — Z98.89 OTHER SPECIFIED POSTPROCEDURAL STATES: Chronic | ICD-10-CM

## 2022-08-05 PROCEDURE — 96375 TX/PRO/DX INJ NEW DRUG ADDON: CPT

## 2022-08-05 PROCEDURE — 96411 CHEMO IV PUSH ADDL DRUG: CPT

## 2022-08-05 PROCEDURE — 96372 THER/PROPH/DIAG INJ SC/IM: CPT

## 2022-08-05 RX ORDER — BENDAMUSTINE HYDROCHLORIDE 100 MG/20ML
130 INJECTION, POWDER, LYOPHILIZED, FOR SOLUTION INTRAVENOUS ONCE
Refills: 0 | Status: COMPLETED | OUTPATIENT
Start: 2022-08-05 | End: 2022-08-05

## 2022-08-05 RX ORDER — ONDANSETRON 8 MG/1
16 TABLET, FILM COATED ORAL ONCE
Refills: 0 | Status: COMPLETED | OUTPATIENT
Start: 2022-08-05 | End: 2022-08-05

## 2022-08-05 RX ORDER — PEGFILGRASTIM-CBQV 6 MG/.6ML
6 INJECTION, SOLUTION SUBCUTANEOUS ONCE
Refills: 0 | Status: COMPLETED | OUTPATIENT
Start: 2022-08-05 | End: 2022-08-05

## 2022-08-05 RX ADMIN — BENDAMUSTINE HYDROCHLORIDE 130 MILLIGRAM(S): 100 INJECTION, POWDER, LYOPHILIZED, FOR SOLUTION INTRAVENOUS at 14:12

## 2022-08-05 RX ADMIN — ONDANSETRON 16 MILLIGRAM(S): 8 TABLET, FILM COATED ORAL at 14:10

## 2022-08-05 RX ADMIN — ONDANSETRON 232 MILLIGRAM(S): 8 TABLET, FILM COATED ORAL at 13:55

## 2022-08-05 RX ADMIN — BENDAMUSTINE HYDROCHLORIDE 130 MILLIGRAM(S): 100 INJECTION, POWDER, LYOPHILIZED, FOR SOLUTION INTRAVENOUS at 14:25

## 2022-08-05 RX ADMIN — PEGFILGRASTIM-CBQV 6 MILLIGRAM(S): 6 INJECTION, SOLUTION SUBCUTANEOUS at 14:26

## 2022-08-10 ENCOUNTER — LABORATORY RESULT (OUTPATIENT)
Age: 67
End: 2022-08-10

## 2022-08-10 ENCOUNTER — APPOINTMENT (OUTPATIENT)
Dept: HEMATOLOGY ONCOLOGY | Facility: CLINIC | Age: 67
End: 2022-08-10

## 2022-08-10 VITALS
TEMPERATURE: 94.3 F | DIASTOLIC BLOOD PRESSURE: 90 MMHG | WEIGHT: 170 LBS | RESPIRATION RATE: 16 BRPM | BODY MASS INDEX: 27.32 KG/M2 | HEART RATE: 111 BPM | SYSTOLIC BLOOD PRESSURE: 132 MMHG | HEIGHT: 66 IN | OXYGEN SATURATION: 96 %

## 2022-08-10 DIAGNOSIS — R53.83 OTHER FATIGUE: ICD-10-CM

## 2022-08-10 PROCEDURE — 99214 OFFICE O/P EST MOD 30 MIN: CPT | Mod: 25

## 2022-08-10 PROCEDURE — 36415 COLL VENOUS BLD VENIPUNCTURE: CPT

## 2022-08-10 NOTE — ASSESSMENT
[FreeTextEntry1] : 66 years old with longstanding follicular lymphoma... Since 2012... Most recently he had growth of the intra-abdominal lymph nodes as well as peripheral lymph nodes...\par \par He received his first course of Bendamustine rituximab at our infusion center last week... Tolerated very well... Discussed with patient the fact that his disease is diffuse, and therefore he requires chemotherapy, radiation therapy has no role in his disease at present time...\par \par Repeat blood work done..\par \par Patient complaining of severe shortness of breath... I reviewed his chart ...he saw Dr. Ciro Ferreira pulmonologist for shortness of breath back in 2015... I gave patient a new referral for Dr. Ferreira.\par \par Patient is scheduled for his second dose of chemotherapy on August 25.\par \par Follow-up here on September 6.

## 2022-08-10 NOTE — HISTORY OF PRESENT ILLNESS
[de-identified] : 66 years old male history of follicular lymphoma on no treatment since 2018... He was initially followed by Dr. Carlos and later on by Dr. Gallagher... Last week he was admitted to [facility] where they offered him chemotherapy however he declined... He is here for a second opinion but does not have his records...\par \par I reviewed his records in the [facility], we do not even have a CBC on the patient.. [de-identified] : July 5, 2022 patient returns for follow-up bringing his records from [facility] as well as CD-ROM's...\par \par July 21, 2022 patient caught COVID last week he was unable to receive his first dose of chemotherapy with Bendamustine and rituximab... He is feeling better now however he has not retested himself to see if he is COVID-negative.\par \par \par August 10, 2022 patient returns for follow-up... Tolerated his first dose of chemotherapy well... Patient states he never received treatment with chemotherapy or radiation therapy for his lymphoma.

## 2022-08-11 LAB
ALBUMIN SERPL ELPH-MCNC: 3.8 G/DL
ALP BLD-CCNC: 115 U/L
ALT SERPL-CCNC: 11 U/L
ANION GAP SERPL CALC-SCNC: 13 MMOL/L
AST SERPL-CCNC: 18 U/L
BASOPHILS # BLD AUTO: 0 K/UL
BASOPHILS NFR BLD AUTO: 0 %
BILIRUB SERPL-MCNC: 0.4 MG/DL
BUN SERPL-MCNC: 9 MG/DL
CALCIUM SERPL-MCNC: 9.3 MG/DL
CHLORIDE SERPL-SCNC: 103 MMOL/L
CO2 SERPL-SCNC: 27 MMOL/L
CREAT SERPL-MCNC: 0.9 MG/DL
EGFR: 93 ML/MIN/1.73M2
EOSINOPHIL # BLD AUTO: 0.65 K/UL
EOSINOPHIL NFR BLD AUTO: 1.7 %
ERYTHROCYTE [SEDIMENTATION RATE] IN BLOOD BY WESTERGREN METHOD: 73 MM/HR
GLUCOSE SERPL-MCNC: 102 MG/DL
HCT VFR BLD CALC: 37.2 %
HGB BLD-MCNC: 11.3 G/DL
LDH SERPL-CCNC: 520 U/L
LYMPHOCYTES # BLD AUTO: 1.68 K/UL
LYMPHOCYTES NFR BLD AUTO: 4.4 %
MAN DIFF?: NORMAL
MCHC RBC-ENTMCNC: 25.5 PG
MCHC RBC-ENTMCNC: 30.4 GM/DL
MCV RBC AUTO: 83.8 FL
MONOCYTES # BLD AUTO: 0.65 K/UL
MONOCYTES NFR BLD AUTO: 1.7 %
NEUTROPHILS # BLD AUTO: 34.89 K/UL
NEUTROPHILS NFR BLD AUTO: 89.5 %
PLATELET # BLD AUTO: 380 K/UL
POTASSIUM SERPL-SCNC: 3.5 MMOL/L
PROT SERPL-MCNC: 6.2 G/DL
RBC # BLD: 4.44 M/UL
RBC # FLD: 17.5 %
SODIUM SERPL-SCNC: 144 MMOL/L
WBC # FLD AUTO: 38.22 K/UL

## 2022-08-25 ENCOUNTER — OUTPATIENT (OUTPATIENT)
Dept: OUTPATIENT SERVICES | Facility: HOSPITAL | Age: 67
LOS: 1 days | End: 2022-08-25
Payer: MEDICARE

## 2022-08-25 ENCOUNTER — APPOINTMENT (OUTPATIENT)
Dept: INFUSION THERAPY | Facility: CLINIC | Age: 67
End: 2022-08-25

## 2022-08-25 VITALS
DIASTOLIC BLOOD PRESSURE: 79 MMHG | HEART RATE: 93 BPM | SYSTOLIC BLOOD PRESSURE: 121 MMHG | HEIGHT: 67 IN | OXYGEN SATURATION: 96 % | TEMPERATURE: 98 F | RESPIRATION RATE: 17 BRPM | WEIGHT: 169.98 LBS

## 2022-08-25 VITALS
TEMPERATURE: 98 F | DIASTOLIC BLOOD PRESSURE: 64 MMHG | OXYGEN SATURATION: 96 % | SYSTOLIC BLOOD PRESSURE: 118 MMHG | RESPIRATION RATE: 17 BRPM | HEART RATE: 64 BPM

## 2022-08-25 DIAGNOSIS — C84.70 ANAPLASTIC LARGE CELL LYMPHOMA, ALK-NEGATIVE, UNSPECIFIED SITE: ICD-10-CM

## 2022-08-25 DIAGNOSIS — Z98.89 OTHER SPECIFIED POSTPROCEDURAL STATES: Chronic | ICD-10-CM

## 2022-08-25 PROCEDURE — 96413 CHEMO IV INFUSION 1 HR: CPT

## 2022-08-25 PROCEDURE — 96415 CHEMO IV INFUSION ADDL HR: CPT

## 2022-08-25 PROCEDURE — 96401 CHEMO ANTI-NEOPL SQ/IM: CPT

## 2022-08-25 PROCEDURE — 96375 TX/PRO/DX INJ NEW DRUG ADDON: CPT

## 2022-08-25 RX ORDER — DEXAMETHASONE 0.5 MG/5ML
20 ELIXIR ORAL ONCE
Refills: 0 | Status: COMPLETED | OUTPATIENT
Start: 2022-08-25 | End: 2022-08-25

## 2022-08-25 RX ORDER — ACETAMINOPHEN 500 MG
500 TABLET ORAL ONCE
Refills: 0 | Status: COMPLETED | OUTPATIENT
Start: 2022-08-25 | End: 2022-08-25

## 2022-08-25 RX ORDER — RITUXIMAB 10 MG/ML
700 INJECTION, SOLUTION INTRAVENOUS ONCE
Refills: 0 | Status: COMPLETED | OUTPATIENT
Start: 2022-08-25 | End: 2022-08-25

## 2022-08-25 RX ORDER — ONDANSETRON 8 MG/1
16 TABLET, FILM COATED ORAL ONCE
Refills: 0 | Status: COMPLETED | OUTPATIENT
Start: 2022-08-25 | End: 2022-08-25

## 2022-08-25 RX ADMIN — ONDANSETRON 16 MILLIGRAM(S): 8 TABLET, FILM COATED ORAL at 14:45

## 2022-08-25 RX ADMIN — BENDAMUSTINE HYDROCHLORIDE 130 MILLIGRAM(S): 100 INJECTION, POWDER, LYOPHILIZED, FOR SOLUTION INTRAVENOUS at 15:00

## 2022-08-25 RX ADMIN — RITUXIMAB 700 MILLIGRAM(S): 10 INJECTION, SOLUTION INTRAVENOUS at 11:10

## 2022-08-25 RX ADMIN — ONDANSETRON 232 MILLIGRAM(S): 8 TABLET, FILM COATED ORAL at 14:30

## 2022-08-25 RX ADMIN — RITUXIMAB 700 MILLIGRAM(S): 10 INJECTION, SOLUTION INTRAVENOUS at 14:20

## 2022-08-25 RX ADMIN — Medication 500 MILLIGRAM(S): at 10:55

## 2022-08-25 RX ADMIN — Medication 20 MILLIGRAM(S): at 10:43

## 2022-08-25 RX ADMIN — BENDAMUSTINE HYDROCHLORIDE 130 MILLIGRAM(S): 100 INJECTION, POWDER, LYOPHILIZED, FOR SOLUTION INTRAVENOUS at 14:50

## 2022-08-25 RX ADMIN — Medication 500 MILLIGRAM(S): at 10:42

## 2022-08-26 ENCOUNTER — OUTPATIENT (OUTPATIENT)
Dept: OUTPATIENT SERVICES | Facility: HOSPITAL | Age: 67
LOS: 1 days | End: 2022-08-26
Payer: MEDICARE

## 2022-08-26 ENCOUNTER — APPOINTMENT (OUTPATIENT)
Dept: INFUSION THERAPY | Facility: CLINIC | Age: 67
End: 2022-08-26

## 2022-08-26 VITALS
HEART RATE: 72 BPM | OXYGEN SATURATION: 97 % | SYSTOLIC BLOOD PRESSURE: 122 MMHG | DIASTOLIC BLOOD PRESSURE: 73 MMHG | RESPIRATION RATE: 16 BRPM | TEMPERATURE: 98 F

## 2022-08-26 VITALS
WEIGHT: 169.98 LBS | HEIGHT: 67 IN | DIASTOLIC BLOOD PRESSURE: 68 MMHG | RESPIRATION RATE: 18 BRPM | OXYGEN SATURATION: 98 % | HEART RATE: 68 BPM | TEMPERATURE: 97 F | SYSTOLIC BLOOD PRESSURE: 120 MMHG

## 2022-08-26 DIAGNOSIS — Z98.89 OTHER SPECIFIED POSTPROCEDURAL STATES: Chronic | ICD-10-CM

## 2022-08-26 DIAGNOSIS — C84.70 ANAPLASTIC LARGE CELL LYMPHOMA, ALK-NEGATIVE, UNSPECIFIED SITE: ICD-10-CM

## 2022-08-26 PROCEDURE — 96413 CHEMO IV INFUSION 1 HR: CPT

## 2022-08-26 PROCEDURE — 96375 TX/PRO/DX INJ NEW DRUG ADDON: CPT

## 2022-08-26 RX ORDER — PEGFILGRASTIM-CBQV 6 MG/.6ML
6 INJECTION, SOLUTION SUBCUTANEOUS ONCE
Refills: 0 | Status: COMPLETED | OUTPATIENT
Start: 2022-08-26 | End: 2022-08-26

## 2022-08-26 RX ORDER — ONDANSETRON 8 MG/1
16 TABLET, FILM COATED ORAL ONCE
Refills: 0 | Status: COMPLETED | OUTPATIENT
Start: 2022-08-26 | End: 2022-08-26

## 2022-08-26 RX ORDER — BENDAMUSTINE HYDROCHLORIDE 100 MG/20ML
130 INJECTION, POWDER, LYOPHILIZED, FOR SOLUTION INTRAVENOUS ONCE
Refills: 0 | Status: COMPLETED | OUTPATIENT
Start: 2022-08-26 | End: 2022-08-26

## 2022-08-26 RX ADMIN — BENDAMUSTINE HYDROCHLORIDE 130 MILLIGRAM(S): 100 INJECTION, POWDER, LYOPHILIZED, FOR SOLUTION INTRAVENOUS at 11:33

## 2022-08-26 RX ADMIN — ONDANSETRON 232 MILLIGRAM(S): 8 TABLET, FILM COATED ORAL at 10:19

## 2022-08-26 RX ADMIN — PEGFILGRASTIM-CBQV 6 MILLIGRAM(S): 6 INJECTION, SOLUTION SUBCUTANEOUS at 12:00

## 2022-08-26 RX ADMIN — BENDAMUSTINE HYDROCHLORIDE 130 MILLIGRAM(S): 100 INJECTION, POWDER, LYOPHILIZED, FOR SOLUTION INTRAVENOUS at 11:50

## 2022-08-26 RX ADMIN — ONDANSETRON 16 MILLIGRAM(S): 8 TABLET, FILM COATED ORAL at 10:35

## 2022-08-29 ENCOUNTER — EMERGENCY (EMERGENCY)
Facility: HOSPITAL | Age: 67
LOS: 1 days | Discharge: HOME | End: 2022-08-29
Attending: EMERGENCY MEDICINE | Admitting: EMERGENCY MEDICINE
Payer: MEDICARE

## 2022-08-29 ENCOUNTER — APPOINTMENT (OUTPATIENT)
Dept: PULMONOLOGY | Facility: CLINIC | Age: 67
End: 2022-08-29

## 2022-08-29 VITALS
BODY MASS INDEX: 27 KG/M2 | TEMPERATURE: 97.9 F | HEART RATE: 100 BPM | DIASTOLIC BLOOD PRESSURE: 80 MMHG | HEIGHT: 66 IN | SYSTOLIC BLOOD PRESSURE: 127 MMHG | OXYGEN SATURATION: 96 % | WEIGHT: 168 LBS

## 2022-08-29 VITALS
HEIGHT: 67 IN | WEIGHT: 167.99 LBS | DIASTOLIC BLOOD PRESSURE: 83 MMHG | TEMPERATURE: 98 F | SYSTOLIC BLOOD PRESSURE: 120 MMHG | RESPIRATION RATE: 20 BRPM | HEART RATE: 127 BPM | OXYGEN SATURATION: 94 %

## 2022-08-29 VITALS
OXYGEN SATURATION: 95 % | SYSTOLIC BLOOD PRESSURE: 121 MMHG | DIASTOLIC BLOOD PRESSURE: 80 MMHG | HEART RATE: 97 BPM | RESPIRATION RATE: 18 BRPM

## 2022-08-29 DIAGNOSIS — Z98.89 OTHER SPECIFIED POSTPROCEDURAL STATES: Chronic | ICD-10-CM

## 2022-08-29 LAB
ANION GAP SERPL CALC-SCNC: 12 MMOL/L — SIGNIFICANT CHANGE UP (ref 5–17)
ANISOCYTOSIS BLD QL: SLIGHT — SIGNIFICANT CHANGE UP
APTT BLD: 34.2 SEC — SIGNIFICANT CHANGE UP (ref 27.5–35.5)
BASE EXCESS BLDV CALC-SCNC: 6 MMOL/L — HIGH (ref -2–3)
BASOPHILS # BLD AUTO: 0.4 K/UL — HIGH (ref 0–0.2)
BASOPHILS NFR BLD AUTO: 0.9 % — SIGNIFICANT CHANGE UP (ref 0–2)
BLD GP AB SCN SERPL QL: NEGATIVE — SIGNIFICANT CHANGE UP
BUN SERPL-MCNC: 9 MG/DL — SIGNIFICANT CHANGE UP (ref 7–23)
CA-I SERPL-SCNC: 1.29 MMOL/L — SIGNIFICANT CHANGE UP (ref 1.15–1.33)
CALCIUM SERPL-MCNC: 9.8 MG/DL — SIGNIFICANT CHANGE UP (ref 8.4–10.5)
CHLORIDE SERPL-SCNC: 103 MMOL/L — SIGNIFICANT CHANGE UP (ref 96–108)
CO2 BLDV-SCNC: 31.9 MMOL/L — HIGH (ref 22–26)
CO2 SERPL-SCNC: 27 MMOL/L — SIGNIFICANT CHANGE UP (ref 22–31)
CREAT SERPL-MCNC: 0.83 MG/DL — SIGNIFICANT CHANGE UP (ref 0.5–1.3)
EGFR: 95 ML/MIN/1.73M2 — SIGNIFICANT CHANGE UP
ELLIPTOCYTES BLD QL SMEAR: SLIGHT — SIGNIFICANT CHANGE UP
EOSINOPHIL # BLD AUTO: 0 K/UL — SIGNIFICANT CHANGE UP (ref 0–0.5)
EOSINOPHIL NFR BLD AUTO: 0 % — SIGNIFICANT CHANGE UP (ref 0–6)
GAS PNL BLDV: 138 MMOL/L — SIGNIFICANT CHANGE UP (ref 136–145)
GAS PNL BLDV: SIGNIFICANT CHANGE UP
GAS PNL BLDV: SIGNIFICANT CHANGE UP
GLUCOSE SERPL-MCNC: 104 MG/DL — HIGH (ref 70–99)
HCO3 BLDV-SCNC: 31 MMOL/L — HIGH (ref 22–29)
HCT VFR BLD CALC: 36 % — LOW (ref 39–50)
HGB BLD-MCNC: 11.3 G/DL — LOW (ref 13–17)
INR BLD: 1.32 — HIGH (ref 0.88–1.16)
LYMPHOCYTES # BLD AUTO: 0.4 K/UL — LOW (ref 1–3.3)
LYMPHOCYTES # BLD AUTO: 0.9 % — LOW (ref 13–44)
MAGNESIUM SERPL-MCNC: 1.9 MG/DL — SIGNIFICANT CHANGE UP (ref 1.6–2.6)
MANUAL SMEAR VERIFICATION: SIGNIFICANT CHANGE UP
MCHC RBC-ENTMCNC: 25.7 PG — LOW (ref 27–34)
MCHC RBC-ENTMCNC: 31.4 GM/DL — LOW (ref 32–36)
MCV RBC AUTO: 82 FL — SIGNIFICANT CHANGE UP (ref 80–100)
MICROCYTES BLD QL: SLIGHT — SIGNIFICANT CHANGE UP
MONOCYTES # BLD AUTO: 0.75 K/UL — SIGNIFICANT CHANGE UP (ref 0–0.9)
MONOCYTES NFR BLD AUTO: 1.7 % — LOW (ref 2–14)
NEUTROPHILS # BLD AUTO: 42.28 K/UL — HIGH (ref 1.8–7.4)
NEUTROPHILS NFR BLD AUTO: 95.6 % — HIGH (ref 43–77)
NT-PROBNP SERPL-SCNC: 115 PG/ML — SIGNIFICANT CHANGE UP (ref 0–300)
OVALOCYTES BLD QL SMEAR: SLIGHT — SIGNIFICANT CHANGE UP
PCO2 BLDV: 43 MMHG — SIGNIFICANT CHANGE UP (ref 42–55)
PH BLDV: 7.46 — HIGH (ref 7.32–7.43)
PLAT MORPH BLD: NORMAL — SIGNIFICANT CHANGE UP
PLATELET # BLD AUTO: 226 K/UL — SIGNIFICANT CHANGE UP (ref 150–400)
PO2 BLDV: 54 MMHG — HIGH (ref 25–45)
POIKILOCYTOSIS BLD QL AUTO: SLIGHT — SIGNIFICANT CHANGE UP
POLYCHROMASIA BLD QL SMEAR: SLIGHT — SIGNIFICANT CHANGE UP
POTASSIUM BLDV-SCNC: 3.3 MMOL/L — LOW (ref 3.5–5.1)
POTASSIUM SERPL-MCNC: 3.5 MMOL/L — SIGNIFICANT CHANGE UP (ref 3.5–5.3)
POTASSIUM SERPL-SCNC: 3.5 MMOL/L — SIGNIFICANT CHANGE UP (ref 3.5–5.3)
PROMYELOCYTES # FLD: 0.9 % — HIGH (ref 0–0)
PROTHROM AB SERPL-ACNC: 15.8 SEC — HIGH (ref 10.5–13.4)
RBC # BLD: 4.39 M/UL — SIGNIFICANT CHANGE UP (ref 4.2–5.8)
RBC # FLD: 20.6 % — HIGH (ref 10.3–14.5)
RBC BLD AUTO: ABNORMAL
RH IG SCN BLD-IMP: POSITIVE — SIGNIFICANT CHANGE UP
SAO2 % BLDV: 86.3 % — SIGNIFICANT CHANGE UP (ref 67–88)
SARS-COV-2 RNA SPEC QL NAA+PROBE: NEGATIVE — SIGNIFICANT CHANGE UP
SODIUM SERPL-SCNC: 142 MMOL/L — SIGNIFICANT CHANGE UP (ref 135–145)
WBC # BLD: 44.23 K/UL — CRITICAL HIGH (ref 3.8–10.5)
WBC # FLD AUTO: 44.23 K/UL — CRITICAL HIGH (ref 3.8–10.5)

## 2022-08-29 PROCEDURE — 86850 RBC ANTIBODY SCREEN: CPT

## 2022-08-29 PROCEDURE — 85025 COMPLETE CBC W/AUTO DIFF WBC: CPT

## 2022-08-29 PROCEDURE — 86901 BLOOD TYPING SEROLOGIC RH(D): CPT

## 2022-08-29 PROCEDURE — 82330 ASSAY OF CALCIUM: CPT

## 2022-08-29 PROCEDURE — 84295 ASSAY OF SERUM SODIUM: CPT

## 2022-08-29 PROCEDURE — 99204 OFFICE O/P NEW MOD 45 MIN: CPT

## 2022-08-29 PROCEDURE — 84132 ASSAY OF SERUM POTASSIUM: CPT

## 2022-08-29 PROCEDURE — 71045 X-RAY EXAM CHEST 1 VIEW: CPT | Mod: 26

## 2022-08-29 PROCEDURE — 85610 PROTHROMBIN TIME: CPT

## 2022-08-29 PROCEDURE — 86900 BLOOD TYPING SEROLOGIC ABO: CPT

## 2022-08-29 PROCEDURE — 83735 ASSAY OF MAGNESIUM: CPT

## 2022-08-29 PROCEDURE — 36415 COLL VENOUS BLD VENIPUNCTURE: CPT

## 2022-08-29 PROCEDURE — 93005 ELECTROCARDIOGRAM TRACING: CPT

## 2022-08-29 PROCEDURE — 83880 ASSAY OF NATRIURETIC PEPTIDE: CPT

## 2022-08-29 PROCEDURE — 80048 BASIC METABOLIC PNL TOTAL CA: CPT

## 2022-08-29 PROCEDURE — 93010 ELECTROCARDIOGRAM REPORT: CPT

## 2022-08-29 PROCEDURE — 99284 EMERGENCY DEPT VISIT MOD MDM: CPT | Mod: CS

## 2022-08-29 PROCEDURE — 85730 THROMBOPLASTIN TIME PARTIAL: CPT

## 2022-08-29 PROCEDURE — 99285 EMERGENCY DEPT VISIT HI MDM: CPT | Mod: 25

## 2022-08-29 PROCEDURE — 87635 SARS-COV-2 COVID-19 AMP PRB: CPT

## 2022-08-29 PROCEDURE — 71045 X-RAY EXAM CHEST 1 VIEW: CPT

## 2022-08-29 PROCEDURE — 82803 BLOOD GASES ANY COMBINATION: CPT

## 2022-08-29 RX ORDER — ONDANSETRON 8 MG/1
1 TABLET, FILM COATED ORAL
Qty: 30 | Refills: 0
Start: 2022-08-29

## 2022-08-29 NOTE — ED PROVIDER NOTE - NSFOLLOWUPINSTRUCTIONS_ED_ALL_ED_FT
Your blood work is significant for a high white blood cell count. You reported you received Neulasta on 8/26, which will raise your white blood cells. You last white blood cell count on 8/10/22 was 38. Please follow up closely with Dr Harper. The remainder of your blood work did not show any acute abnormalities.    You have a small pleural effusion on the right side. Your respiratory status is stable and this does not require treatment at this time. Follow up with Dr Ferreira.       Pleural Effusion    WHAT YOU NEED TO KNOW:    What is pleural effusion? Pleural effusion is fluid buildup in the space between the layers of the pleura. The pleura is a thin piece of tissue with 2 layers. One layer rests directly on the lungs. The other rests on the chest wall. There is normally a small amount of fluid between these layers. This fluid helps your lungs move easily when you breathe.  The Lungs         What causes pleural effusion?   •Heart and lung problems, such as heart failure or a pulmonary embolism (blockage of a blood vessel in the lungs)      •Lung infections such as bacterial pneumonia or tuberculosis (TB)      •Inflammation of the pleura (pleurisy), a trapped lung       •Problems with organs in your chest or abdomen, such as liver cancer, pancreatitis, or an injury       What are the signs and symptoms of pleural effusion?   •Cough, shortness of breath      •Breathing faster than usual      •Chest pain when you breathe in or cough      •Fever      How is pleural effusion diagnosed? Your healthcare provider will examine you and listen to your heart and lungs through a stethoscope. You may need any of the following:   •Blood tests may show signs of an infection.      •An x-ray, ultrasound, or CT may show fluid around your lungs, an enlarged heart, or signs of infection. You may be given contrast liquid to help your lungs show up better in the pictures. Tell the healthcare provider if you have ever had an allergic reaction to contrast liquid.      •A thoracentesis is a procedure to take fluid out of your chest through a needle put between your ribs. The fluid may be sent to a lab for tests.      How is pleural effusion treated? Treatment depends on the cause of your pleural effusion and your symptoms. You may need any of the following:   •Cardiac medicines may be needed if your pleural effusion is caused by heart failure.      •Antibiotics help treat an infection caused by bacteria.      •NSAIDs help decrease swelling and pain or fever. This medicine is available with or without a doctor's order. NSAIDs can cause stomach bleeding or kidney problems in certain people. If you take blood thinner medicine, always ask your healthcare provider if NSAIDs are safe for you. Always read the medicine label and follow directions.      •Prescription pain medicine may be given. Ask your healthcare provider how to take this medicine safely. Some prescription pain medicines contain acetaminophen. Do not take other medicines that contain acetaminophen without talking to your healthcare provider. Too much acetaminophen may cause liver damage. Prescription pain medicine may cause constipation. Ask your healthcare provider how to prevent or treat constipation.       •Chemotherapy may be needed if your pleural effusion is caused by cancer.       •Steroids,or other types of medicines, may be given to decrease swelling.      •Drainage of extra pleural fluid may be done using thoracentesis or a chest tube. A chest tube may stay in your chest for days or weeks. This allows the extra fluid around your lungs to drain over time. You may need medicines put directly into your chest if the fluid does not drain out easily.      •Surgery may be needed if your pleural effusion keeps coming back or if it increases your risk for other problems.      How can I prevent another pleural effusion? Maintain a healthy lifestyle:  •Eat a variety of healthy foods. Healthy foods help with overall health. Healthy foods include fruit, vegetables, whole-grain breads, low-fat dairy products, beans, lean meat, and fish. Limit sugar, alcohol, and fat.       •Do not smoke and do not allow others to smoke around you. Nicotine and other chemicals in cigarettes and cigars increase your risk for lung infections such as pneumonia. Ask your healthcare provider for information if you currently smoke and need help to quit. E-cigarettes or smokeless tobacco still contain nicotine. Talk to your healthcare provider before you use these products.      •Drink liquids as directed and rest as needed. Liquids help keep your air passages moist. This can help your body get rid of germs and other irritants. Ask your healthcare provider how much liquid to drink each day and which liquids are best for you. You may feel like resting more. Slowly start to do more each day. Rest when you feel it is needed.      •Exercise regularly. Ask about the best exercise plan for you. Exercise will lower your blood pressure and decrease stress. This helps decrease your risk for another pleural effusion, or a lung infection.       Call your local emergency number (911 in the [place]) if:   •You find it very hard to breathe.      •You feel faint, or you cannot think clearly.      When should I seek immediate care?   •Your breathing problems do not go away, or they get worse.          When should I call my doctor?   •Your lips or fingernails turn blue.      •You have a fever.      •Your pain does not go away or gets worse.      •You cough up yellow, green, gray, or bloody mucus.      •You have questions or concerns about your condition or care.      CARE AGREEMENT:    You have the right to help plan your care. Learn about your health condition and how it may be treated. Discuss treatment options with your healthcare providers to decide what care you want to receive. You always have the right to refuse treatment.

## 2022-08-29 NOTE — ED PROVIDER NOTE - PATIENT PORTAL LINK FT
You can access the FollowMyHealth Patient Portal offered by [facility] by registering at the following website: [email or web address]. By joining [facility]’s FollowMyHealth portal, you will also be able to view your health information using other applications (apps) compatible with our system.

## 2022-08-29 NOTE — ED ADULT TRIAGE NOTE - CHIEF COMPLAINT QUOTE
Patient reports shortness of breath x 1.5 month and generalized weakness, went to see Dr. Ferreira and was recommended to the ED "he told me I have fluid on my lungs."  Denies chest pain.

## 2022-08-29 NOTE — ED ADULT NURSE NOTE - OBJECTIVE STATEMENT
66y M, presents to ED c/o generalized weakness and SOB x1.5 months. Pt states, "I Dr. Ferreira who told me I had fluid in lungs, and I needed to come to the ED drain out the fluid." Denies chest pain, fever, chills, NVD, numbness, tingling, urinary symptoms. Pt A&Ox4, ambulatory with steady gait, speaking in clear/full sentences, vital signs stable. Continuous cardiac/pulse oximetry monitoring initiated.

## 2022-08-29 NOTE — ED ADULT NURSE NOTE - NSICDXPASTMEDICALHX_GEN_ALL_CORE_FT
PAST MEDICAL HISTORY:  BPH (benign prostatic hypertrophy)     Lymphoma s/p chemotherapy, XRT to abdomen    Major depressive disorder     Mild persistent asthma with acute exacerbation     Prediabetes

## 2022-08-29 NOTE — ED PROVIDER NOTE - PHYSICAL EXAMINATION
Constitutional: Well appearing, awake, alert, oriented to person, place, time/situation and in no apparent distress.  ENMT: Airway patent. Normal MM  Eyes: Clear bilaterally  Cardiac: Normal rate, regular rhythm.  Heart sounds S1, S2.  No murmurs, rubs or gallops. No JVD or LE edema  Respiratory: Breaths sounds equal and clear b/l. No W/R/R. No increased WOB, tachypnea, hypoxia, or accessory mm use. Pt speaks in full sentences.   Gastrointestinal: Abd soft, NT, ND, NABS. No guarding, rebound, or rigidity. No pulsatile abdominal masses. No organomegaly appreciated.    Musculoskeletal: Range of motion is not limited. No LE edema or calf ttp  Neuro: Alert and oriented x 3, face symmetric and speech fluent. Strength 5/5 x 4 ext and symmetric, nml gross motor movement, nml gait. No focal deficits noted.  Skin: Skin normal color for race, warm, dry and intact. No evidence of rash.  Psych: Alert and oriented to person, place, time/situation. normal mood and affect. no apparent risk to self or others.
Improved

## 2022-08-29 NOTE — ED PROVIDER NOTE - CLINICAL SUMMARY MEDICAL DECISION MAKING FREE TEXT BOX
Referred for ? pleural effusion. No abnormal lung sounds appreciated. No inc WOB, tachypnea, or hypoxia. No sx decompensated HF. Effusion likely malignant. Cannot view outpt XR. Will check labs, XR. Dispo pending w/u and clinical status

## 2022-08-29 NOTE — ED PROVIDER NOTE - PROGRESS NOTE DETAILS
Resp status stable w/o inc WOB, tachpnea, or hypoxia. Small effusion, does not require admission for intervention at this time. Close f/u pulm.   High WBC in the setting of lymphoma, recently started on chemo, received Neulasta on 8/26, prior WBC on 8/10 38.   Left message for Dr Harper  D/w Dr Vernon covering Dr Ferreira, will d/c and f/u heme / pulm as outpt

## 2022-08-29 NOTE — ED PROVIDER NOTE - NS ED ROS FT
Constitutional: No fever or chills.   Eyes: No pain, blurry vision, or discharge.  ENMT: No hearing changes, pain, discharge or infections. No neck pain or stiffness.  Cardiac: See HPI. No chest pain with exertion.  Respiratory: See HPI  GI: No nausea, vomiting, diarrhea or abdominal pain.  : No dysuria, frequency or burning.  MS: No myalgia, muscle weakness, joint pain or back pain.  Neuro: No headache or weakness. No LOC.  Skin: No skin rash.   Endocrine: No history of thyroid disease or diabetes.  Except as documented in the HPI, all other systems are negative.

## 2022-08-29 NOTE — ED PROVIDER NOTE - OBJECTIVE STATEMENT
Pt w/ PMHx follicular lymphoma since 2012, not previously on tx, recently started on Bendamustine and rituximab (1st tx 8/10, 2nd 8/25), BPH, asthma, referred to the ED by pulm Dr Ferreira for "fluid in my lungs." Pt reports 1-2 months HOLT, fatigue / gen weakness. He denies hx CHF, renal dysfunction. He reports occasional PND, but denies orthopnea, LE edema, CP. He states he had an outpt CXR which showed "fluid in my lungs." Denies cough, f/c, hemoptysis, and other infectious sx. No calf ttp. Denies melena/ hematochezia

## 2022-08-30 NOTE — PHYSICAL EXAM
[Normal Appearance] : normal appearance [TextBox_2] : Patient in respiratory distress after minimal exertion [TextBox_54] : Tachycardic, regular rhythm [TextBox_68] : Decreased breath sounds at right lung base

## 2022-08-30 NOTE — ASSESSMENT
[FreeTextEntry1] : Patient with follicular lymphoma, recently started on chemotherapy presenting with 1 month of worsening SOB and exertional lightheadedness. CXR in office showed new right plerual effusion with meniscal sign. Patient significantly SOB, lightheaded, and tachycardic after minimal exertion. Will send to ER given severity of symptoms, for evaluation for thoracentesis and additional workup.

## 2022-08-30 NOTE — REVIEW OF SYSTEMS
[Dyspnea] : dyspnea [SOB on Exertion] : sob on exertion [Negative] : HEENT [Cough] : no cough [Sputum] : no sputum

## 2022-08-30 NOTE — HISTORY OF PRESENT ILLNESS
[TextBox_4] : Patient has a history of Lymphoma that was diagnosed in 2012 s/p chemo/radiation, recently started on chemo radiation for follicular lymphoma, presenting for SOB for 1 month. He was recently admitted to [facility] for 1 week for chest tightness where they wanted to start chemotherapy but he deferred for a second opinion and saw Dr. Harper in outpatient. Prior to starting chemo, he had worsening of his SOB with significant exertional dyspnea and lightheadedness, exertional tolerance reduced to less than one block. Has been using a motorized scooter to get around. No fevers, chills, cough or phlegm. He has been waking up almost every night gasping for breath.  difficulty remembering

## 2022-08-31 DIAGNOSIS — N40.0 BENIGN PROSTATIC HYPERPLASIA WITHOUT LOWER URINARY TRACT SYMPTOMS: ICD-10-CM

## 2022-08-31 DIAGNOSIS — C85.90 NON-HODGKIN LYMPHOMA, UNSPECIFIED, UNSPECIFIED SITE: ICD-10-CM

## 2022-08-31 DIAGNOSIS — R73.03 PREDIABETES: ICD-10-CM

## 2022-08-31 DIAGNOSIS — Z87.891 PERSONAL HISTORY OF NICOTINE DEPENDENCE: ICD-10-CM

## 2022-08-31 DIAGNOSIS — Z20.822 CONTACT WITH AND (SUSPECTED) EXPOSURE TO COVID-19: ICD-10-CM

## 2022-08-31 DIAGNOSIS — Z88.6 ALLERGY STATUS TO ANALGESIC AGENT: ICD-10-CM

## 2022-08-31 DIAGNOSIS — Z88.8 ALLERGY STATUS TO OTHER DRUGS, MEDICAMENTS AND BIOLOGICAL SUBSTANCES STATUS: ICD-10-CM

## 2022-08-31 DIAGNOSIS — J45.31 MILD PERSISTENT ASTHMA WITH (ACUTE) EXACERBATION: ICD-10-CM

## 2022-08-31 DIAGNOSIS — J90 PLEURAL EFFUSION, NOT ELSEWHERE CLASSIFIED: ICD-10-CM

## 2022-08-31 DIAGNOSIS — R53.1 WEAKNESS: ICD-10-CM

## 2022-09-08 ENCOUNTER — APPOINTMENT (OUTPATIENT)
Dept: PULMONOLOGY | Facility: CLINIC | Age: 67
End: 2022-09-08

## 2022-09-08 VITALS
WEIGHT: 164 LBS | OXYGEN SATURATION: 96 % | HEART RATE: 116 BPM | HEIGHT: 66 IN | TEMPERATURE: 97.4 F | RESPIRATION RATE: 12 BRPM | BODY MASS INDEX: 26.36 KG/M2 | DIASTOLIC BLOOD PRESSURE: 83 MMHG | SYSTOLIC BLOOD PRESSURE: 114 MMHG

## 2022-09-08 PROCEDURE — 99203 OFFICE O/P NEW LOW 30 MIN: CPT | Mod: 25

## 2022-09-08 PROCEDURE — 76604 US EXAM CHEST: CPT

## 2022-09-08 PROCEDURE — 93308 TTE F-UP OR LMTD: CPT

## 2022-09-08 NOTE — END OF VISIT
[] : Fellow [Time Spent: ___ minutes] : I have spent [unfilled] minutes of time on the encounter. [FreeTextEntry3] : For a New Patient:\par I, personally performed the evaluation and management (E/M) services for this new patient.  That E/M includes conducting the initial examination, assessing all conditions, and establishing the plan of care.  Today, my ACP was here to observe my evaluation and management services for this patient to be followed going forward.\par \par

## 2022-09-08 NOTE — HISTORY OF PRESENT ILLNESS
[Former] : former [TextBox_4] : Pt is a 65 y/o M, former smoker quit 35 years ago, pmhx of follicular lymphoma in 2012 s/p chemo and radiation with recent recurrence restarted on chemo (Bendamustine and rituxmab 08/10 and 08/25), BPH and asthma. Pt is refereed by Dr. Ferreira for further evaluation of right pleural effusion and need for thoracentesis.  \par \par He had presented to Dr. Ferreira's office on 8/29 with HOLT for 1 month with a cough, intermittently productive of small amount of clear phlegm for 2 weeks. He was found to have a new right pleural effusion on CXR in office and was referred to the ED. In the ED, he had a repeat CXR that showed small right effusion, and was discharged home after receiving nebulizer. Today he reports feeling much better, less SOB but admits he has been exerting himself less and resting in his apartment. Exertional tolerance is still limited to 1/2 a block. He has been using albuterol much less, twice in a few weeks. He denies any LE edema. \par \par \par  No desaturation on walking:\par Starting sat:96-97%, ending sat:96-97%\par Starting HR:115--> ending HR: 125 bpm

## 2022-09-08 NOTE — PHYSICAL EXAM
[No Acute Distress] : no acute distress [Normal Oropharynx] : normal oropharynx [Normal Appearance] : normal appearance [No Neck Mass] : no neck mass [Normal Rate/Rhythm] : normal rate/rhythm [Normal S1, S2] : normal s1, s2 [No Murmurs] : no murmurs [No Resp Distress] : no resp distress [No Abnormalities] : no abnormalities [Benign] : benign [No Clubbing] : no clubbing [No Edema] : no edema [Normal Color/ Pigmentation] : normal color/ pigmentation [Normal Affect] : normal affect [Well Groomed] : well groomed [II] : Mallampati Class: II [Supple] : supple [Soft] : soft [Normal Gait] : normal gait [Oriented x3] : oriented x3 [TextBox_44] : no cervical, supraclavicular or axillary lad [TextBox_68] : Right lung base with mildly decreased breath sounds, otherwise CTA

## 2022-09-08 NOTE — REVIEW OF SYSTEMS
[Cough] : cough [SOB on Exertion] : sob on exertion [Negative] : Gastrointestinal [Fever] : no fever [Chills] : no chills [Chest Tightness] : no chest tightness [Edema] : no edema [PND] : no PND

## 2022-09-08 NOTE — ASSESSMENT
[FreeTextEntry1] : Pt is a 65 y/o M, former smoker quit 35 years ago, pmhx of follicular lymphoma in 2012 s/p chemo and radiation with recent recurrence restarted on chemo (Bendamustine and rituxmab 08/10 and 08/25), BPH and asthma. Pt is refereed by Dr. Ferreira for further evaluation of right pleural effusion and need for thoracentesis.  \par \par Reviewed:\par CXR 8/29/22: small right pleural effusion\par \par O2 sat 96% and  at rest. O2 sat 97% and  after ambulation.\par \par US in office today reveals A-line predominant lung pattern anteriorly consistent with well aerated lungs, trace right effusion, no left effusion, no consolidation. Right effusion is too small to be causing dyspnea and nor amenable for thoracentesis. Differentials for pleural effusion include fluid overload in setting of recent chemotherapy, capillary leak, versus some diastolic dysfunction in setting of persistent tachycardia or small  PE/hemodynamically insignificant, bet in setting of normal saturation this is less likely and patient is improving without intervention.\par \par Encouraged patient to return to clinic if he begins to feel more dyspneic for US monitoring for progression of pleural effusion vs CTA.\par Will discuss findings with referring pulmonologist, Dr. Ferreira.

## 2022-09-08 NOTE — PROCEDURE
[Thoracic Ultrasound] : Thoracic Ultrasound [Simple] : simple [A line] : A line: Yes [Lung sliding] : Lung sliding: Yes [Normal] : EF: Normal [Pleural Effusion] : Pleural Effusion: No [Consolidation] : Consolidation: No [Pericardial Effusion] : Pericardial Effusion: No [Signs of RV pressure or volume overload] : Signs of RV pressure or volume overload: No [FreeTextEntry2] : small 1cm  simple right pleff noted only in scapulary and paraspinal scanning line. [FreeTextEntry4] : no RV enlargement, no Pérez sign

## 2022-09-15 ENCOUNTER — APPOINTMENT (OUTPATIENT)
Dept: INFUSION THERAPY | Facility: CLINIC | Age: 67
End: 2022-09-15

## 2022-09-15 ENCOUNTER — OUTPATIENT (OUTPATIENT)
Dept: OUTPATIENT SERVICES | Facility: HOSPITAL | Age: 67
LOS: 1 days | End: 2022-09-15
Payer: MEDICARE

## 2022-09-15 VITALS
SYSTOLIC BLOOD PRESSURE: 120 MMHG | HEART RATE: 70 BPM | RESPIRATION RATE: 18 BRPM | OXYGEN SATURATION: 99 % | DIASTOLIC BLOOD PRESSURE: 74 MMHG | TEMPERATURE: 98 F

## 2022-09-15 DIAGNOSIS — C84.70 ANAPLASTIC LARGE CELL LYMPHOMA, ALK-NEGATIVE, UNSPECIFIED SITE: ICD-10-CM

## 2022-09-15 DIAGNOSIS — Z98.89 OTHER SPECIFIED POSTPROCEDURAL STATES: Chronic | ICD-10-CM

## 2022-09-15 LAB — SARS-COV-2 RNA SPEC QL NAA+PROBE: NEGATIVE — SIGNIFICANT CHANGE UP

## 2022-09-15 PROCEDURE — 96375 TX/PRO/DX INJ NEW DRUG ADDON: CPT

## 2022-09-15 PROCEDURE — 96417 CHEMO IV INFUS EACH ADDL SEQ: CPT

## 2022-09-15 PROCEDURE — U0003: CPT

## 2022-09-15 PROCEDURE — 96415 CHEMO IV INFUSION ADDL HR: CPT

## 2022-09-15 PROCEDURE — 96413 CHEMO IV INFUSION 1 HR: CPT

## 2022-09-15 RX ORDER — BENDAMUSTINE HYDROCHLORIDE 100 MG/20ML
130 INJECTION, POWDER, LYOPHILIZED, FOR SOLUTION INTRAVENOUS ONCE
Refills: 0 | Status: COMPLETED | OUTPATIENT
Start: 2022-09-15 | End: 2022-09-15

## 2022-09-15 RX ORDER — RITUXIMAB 10 MG/ML
700 INJECTION, SOLUTION INTRAVENOUS ONCE
Refills: 0 | Status: COMPLETED | OUTPATIENT
Start: 2022-09-15 | End: 2022-09-15

## 2022-09-15 RX ORDER — ONDANSETRON 8 MG/1
16 TABLET, FILM COATED ORAL ONCE
Refills: 0 | Status: COMPLETED | OUTPATIENT
Start: 2022-09-15 | End: 2022-09-15

## 2022-09-15 RX ADMIN — ONDANSETRON 232 MILLIGRAM(S): 8 TABLET, FILM COATED ORAL at 11:20

## 2022-09-15 RX ADMIN — BENDAMUSTINE HYDROCHLORIDE 130 MILLIGRAM(S): 100 INJECTION, POWDER, LYOPHILIZED, FOR SOLUTION INTRAVENOUS at 17:00

## 2022-09-15 RX ADMIN — RITUXIMAB 700 MILLIGRAM(S): 10 INJECTION, SOLUTION INTRAVENOUS at 11:40

## 2022-09-15 RX ADMIN — ONDANSETRON 16 MILLIGRAM(S): 8 TABLET, FILM COATED ORAL at 11:35

## 2022-09-15 RX ADMIN — Medication 20 MILLIGRAM(S): at 11:20

## 2022-09-15 RX ADMIN — Medication 500 MILLIGRAM(S): at 11:20

## 2022-09-15 RX ADMIN — BENDAMUSTINE HYDROCHLORIDE 130 MILLIGRAM(S): 100 INJECTION, POWDER, LYOPHILIZED, FOR SOLUTION INTRAVENOUS at 17:40

## 2022-09-15 RX ADMIN — RITUXIMAB 700 MILLIGRAM(S): 10 INJECTION, SOLUTION INTRAVENOUS at 14:37

## 2022-09-16 ENCOUNTER — APPOINTMENT (OUTPATIENT)
Dept: INFUSION THERAPY | Facility: CLINIC | Age: 67
End: 2022-09-16

## 2022-09-16 ENCOUNTER — OUTPATIENT (OUTPATIENT)
Dept: OUTPATIENT SERVICES | Facility: HOSPITAL | Age: 67
LOS: 1 days | End: 2022-09-16
Payer: MEDICARE

## 2022-09-16 VITALS
TEMPERATURE: 98 F | RESPIRATION RATE: 18 BRPM | HEART RATE: 60 BPM | SYSTOLIC BLOOD PRESSURE: 123 MMHG | OXYGEN SATURATION: 97 % | DIASTOLIC BLOOD PRESSURE: 63 MMHG

## 2022-09-16 DIAGNOSIS — C84.70 ANAPLASTIC LARGE CELL LYMPHOMA, ALK-NEGATIVE, UNSPECIFIED SITE: ICD-10-CM

## 2022-09-16 DIAGNOSIS — Z98.89 OTHER SPECIFIED POSTPROCEDURAL STATES: Chronic | ICD-10-CM

## 2022-09-16 PROCEDURE — 96409 CHEMO IV PUSH SNGL DRUG: CPT

## 2022-09-16 PROCEDURE — 96375 TX/PRO/DX INJ NEW DRUG ADDON: CPT

## 2022-09-16 PROCEDURE — 96377 APPLICATON ON-BODY INJECTOR: CPT

## 2022-09-16 PROCEDURE — 96401 CHEMO ANTI-NEOPL SQ/IM: CPT

## 2022-09-16 PROCEDURE — 96372 THER/PROPH/DIAG INJ SC/IM: CPT

## 2022-09-16 RX ORDER — ONDANSETRON 8 MG/1
16 TABLET, FILM COATED ORAL ONCE
Refills: 0 | Status: COMPLETED | OUTPATIENT
Start: 2022-09-16 | End: 2022-09-16

## 2022-09-16 RX ORDER — PEGFILGRASTIM-CBQV 6 MG/.6ML
6 INJECTION, SOLUTION SUBCUTANEOUS ONCE
Refills: 0 | Status: COMPLETED | OUTPATIENT
Start: 2022-09-16 | End: 2022-09-16

## 2022-09-16 RX ORDER — BENDAMUSTINE HYDROCHLORIDE 100 MG/20ML
130 INJECTION, POWDER, LYOPHILIZED, FOR SOLUTION INTRAVENOUS ONCE
Refills: 0 | Status: COMPLETED | OUTPATIENT
Start: 2022-09-16 | End: 2022-09-16

## 2022-09-16 RX ADMIN — BENDAMUSTINE HYDROCHLORIDE 130 MILLIGRAM(S): 100 INJECTION, POWDER, LYOPHILIZED, FOR SOLUTION INTRAVENOUS at 11:20

## 2022-09-16 RX ADMIN — ONDANSETRON 232 MILLIGRAM(S): 8 TABLET, FILM COATED ORAL at 11:00

## 2022-09-16 RX ADMIN — BENDAMUSTINE HYDROCHLORIDE 130 MILLIGRAM(S): 100 INJECTION, POWDER, LYOPHILIZED, FOR SOLUTION INTRAVENOUS at 11:30

## 2022-09-16 RX ADMIN — PEGFILGRASTIM-CBQV 6 MILLIGRAM(S): 6 INJECTION, SOLUTION SUBCUTANEOUS at 11:35

## 2022-09-16 RX ADMIN — ONDANSETRON 16 MILLIGRAM(S): 8 TABLET, FILM COATED ORAL at 11:15

## 2022-10-06 ENCOUNTER — OUTPATIENT (OUTPATIENT)
Dept: OUTPATIENT SERVICES | Facility: HOSPITAL | Age: 67
LOS: 1 days | End: 2022-10-06
Payer: MEDICARE

## 2022-10-06 ENCOUNTER — APPOINTMENT (OUTPATIENT)
Dept: INFUSION THERAPY | Facility: CLINIC | Age: 67
End: 2022-10-06

## 2022-10-06 VITALS
RESPIRATION RATE: 18 BRPM | HEART RATE: 100 BPM | OXYGEN SATURATION: 96 % | TEMPERATURE: 98 F | SYSTOLIC BLOOD PRESSURE: 132 MMHG | DIASTOLIC BLOOD PRESSURE: 88 MMHG

## 2022-10-06 DIAGNOSIS — C84.70 ANAPLASTIC LARGE CELL LYMPHOMA, ALK-NEGATIVE, UNSPECIFIED SITE: ICD-10-CM

## 2022-10-06 DIAGNOSIS — Z98.89 OTHER SPECIFIED POSTPROCEDURAL STATES: Chronic | ICD-10-CM

## 2022-10-06 LAB
ALBUMIN SERPL ELPH-MCNC: 3.4 G/DL — SIGNIFICANT CHANGE UP (ref 3.3–5)
ALP SERPL-CCNC: 56 U/L — SIGNIFICANT CHANGE UP (ref 40–120)
ALT FLD-CCNC: 15 U/L — SIGNIFICANT CHANGE UP (ref 10–45)
ANION GAP SERPL CALC-SCNC: 11 MMOL/L — SIGNIFICANT CHANGE UP (ref 5–17)
AST SERPL-CCNC: 20 U/L — SIGNIFICANT CHANGE UP (ref 10–40)
BILIRUB SERPL-MCNC: 0.6 MG/DL — SIGNIFICANT CHANGE UP (ref 0.2–1.2)
BUN SERPL-MCNC: 36 MG/DL — HIGH (ref 7–23)
CALCIUM SERPL-MCNC: 9.9 MG/DL — SIGNIFICANT CHANGE UP (ref 8.4–10.5)
CHLORIDE SERPL-SCNC: 106 MMOL/L — SIGNIFICANT CHANGE UP (ref 96–108)
CO2 SERPL-SCNC: 26 MMOL/L — SIGNIFICANT CHANGE UP (ref 22–31)
CREAT SERPL-MCNC: 2.7 MG/DL — HIGH (ref 0.5–1.3)
EGFR: 25 ML/MIN/1.73M2 — LOW
GLUCOSE SERPL-MCNC: 94 MG/DL — SIGNIFICANT CHANGE UP (ref 70–99)
HCT VFR BLD CALC: 35.4 % — LOW (ref 39–50)
HGB BLD-MCNC: 11.5 G/DL — LOW (ref 13–17)
LYMPHOCYTES # BLD AUTO: 0.5 K/UL — LOW (ref 1–3.3)
LYMPHOCYTES # BLD AUTO: 5 % — LOW (ref 13–44)
MCHC RBC-ENTMCNC: 28.6 PG — SIGNIFICANT CHANGE UP (ref 27–34)
MCHC RBC-ENTMCNC: 32.5 GM/DL — SIGNIFICANT CHANGE UP (ref 32–36)
MCV RBC AUTO: 88.1 FL — SIGNIFICANT CHANGE UP (ref 80–100)
NEUTROPHILS # BLD AUTO: 7.3 K/UL — SIGNIFICANT CHANGE UP (ref 1.8–7.4)
NEUTROPHILS NFR BLD AUTO: 78.4 % — HIGH (ref 43–77)
PLATELET # BLD AUTO: 253 K/UL — SIGNIFICANT CHANGE UP (ref 150–400)
POTASSIUM SERPL-MCNC: 4.3 MMOL/L — SIGNIFICANT CHANGE UP (ref 3.5–5.3)
POTASSIUM SERPL-SCNC: 4.3 MMOL/L — SIGNIFICANT CHANGE UP (ref 3.5–5.3)
PROT SERPL-MCNC: 6.3 G/DL — SIGNIFICANT CHANGE UP (ref 6–8.3)
RBC # BLD: 4.02 M/UL — LOW (ref 4.2–5.8)
RBC # FLD: 20.2 % — HIGH (ref 10.3–14.5)
SARS-COV-2 RNA SPEC QL NAA+PROBE: NEGATIVE — SIGNIFICANT CHANGE UP
SODIUM SERPL-SCNC: 143 MMOL/L — SIGNIFICANT CHANGE UP (ref 135–145)
WBC # BLD: 9.3 K/UL — SIGNIFICANT CHANGE UP (ref 3.8–10.5)
WBC # FLD AUTO: 9.3 K/UL — SIGNIFICANT CHANGE UP (ref 3.8–10.5)

## 2022-10-06 PROCEDURE — 96375 TX/PRO/DX INJ NEW DRUG ADDON: CPT

## 2022-10-06 PROCEDURE — 36415 COLL VENOUS BLD VENIPUNCTURE: CPT

## 2022-10-06 PROCEDURE — U0003: CPT

## 2022-10-06 PROCEDURE — 85025 COMPLETE CBC W/AUTO DIFF WBC: CPT

## 2022-10-06 PROCEDURE — 96413 CHEMO IV INFUSION 1 HR: CPT

## 2022-10-06 PROCEDURE — 96402 CHEMO HORMON ANTINEOPL SQ/IM: CPT

## 2022-10-06 PROCEDURE — 80053 COMPREHEN METABOLIC PANEL: CPT

## 2022-10-06 PROCEDURE — 96415 CHEMO IV INFUSION ADDL HR: CPT

## 2022-10-06 RX ORDER — BENDAMUSTINE HYDROCHLORIDE 100 MG/20ML
130 INJECTION, POWDER, LYOPHILIZED, FOR SOLUTION INTRAVENOUS ONCE
Refills: 0 | Status: COMPLETED | OUTPATIENT
Start: 2022-10-06 | End: 2022-10-06

## 2022-10-06 RX ORDER — DEXAMETHASONE 0.5 MG/5ML
20 ELIXIR ORAL ONCE
Refills: 0 | Status: COMPLETED | OUTPATIENT
Start: 2022-10-06 | End: 2022-10-06

## 2022-10-06 RX ORDER — ACETAMINOPHEN 500 MG
500 TABLET ORAL ONCE
Refills: 0 | Status: COMPLETED | OUTPATIENT
Start: 2022-10-06 | End: 2022-10-06

## 2022-10-06 RX ORDER — ONDANSETRON 8 MG/1
16 TABLET, FILM COATED ORAL ONCE
Refills: 0 | Status: COMPLETED | OUTPATIENT
Start: 2022-10-06 | End: 2022-10-06

## 2022-10-06 RX ORDER — RITUXIMAB 10 MG/ML
700 INJECTION, SOLUTION INTRAVENOUS ONCE
Refills: 0 | Status: COMPLETED | OUTPATIENT
Start: 2022-10-06 | End: 2022-10-06

## 2022-10-06 RX ADMIN — RITUXIMAB 700 MILLIGRAM(S): 10 INJECTION, SOLUTION INTRAVENOUS at 15:00

## 2022-10-06 RX ADMIN — Medication 20 MILLIGRAM(S): at 11:50

## 2022-10-06 RX ADMIN — BENDAMUSTINE HYDROCHLORIDE 130 MILLIGRAM(S): 100 INJECTION, POWDER, LYOPHILIZED, FOR SOLUTION INTRAVENOUS at 15:20

## 2022-10-06 RX ADMIN — BENDAMUSTINE HYDROCHLORIDE 130 MILLIGRAM(S): 100 INJECTION, POWDER, LYOPHILIZED, FOR SOLUTION INTRAVENOUS at 15:30

## 2022-10-06 RX ADMIN — Medication 500 MILLIGRAM(S): at 12:20

## 2022-10-06 RX ADMIN — Medication 500 MILLIGRAM(S): at 11:50

## 2022-10-06 RX ADMIN — ONDANSETRON 232 MILLIGRAM(S): 8 TABLET, FILM COATED ORAL at 15:00

## 2022-10-06 RX ADMIN — RITUXIMAB 700 MILLIGRAM(S): 10 INJECTION, SOLUTION INTRAVENOUS at 12:00

## 2022-10-06 RX ADMIN — ONDANSETRON 16 MILLIGRAM(S): 8 TABLET, FILM COATED ORAL at 15:15

## 2022-10-07 ENCOUNTER — APPOINTMENT (OUTPATIENT)
Dept: INFUSION THERAPY | Facility: CLINIC | Age: 67
End: 2022-10-07

## 2022-10-07 ENCOUNTER — OUTPATIENT (OUTPATIENT)
Dept: OUTPATIENT SERVICES | Facility: HOSPITAL | Age: 67
LOS: 1 days | End: 2022-10-07
Payer: MEDICARE

## 2022-10-07 VITALS
OXYGEN SATURATION: 97 % | DIASTOLIC BLOOD PRESSURE: 67 MMHG | SYSTOLIC BLOOD PRESSURE: 131 MMHG | RESPIRATION RATE: 18 BRPM | TEMPERATURE: 98 F | HEART RATE: 60 BPM

## 2022-10-07 VITALS
SYSTOLIC BLOOD PRESSURE: 128 MMHG | DIASTOLIC BLOOD PRESSURE: 69 MMHG | RESPIRATION RATE: 18 BRPM | OXYGEN SATURATION: 97 % | WEIGHT: 164.02 LBS | HEART RATE: 55 BPM | TEMPERATURE: 98 F | HEIGHT: 67 IN

## 2022-10-07 DIAGNOSIS — C82.23 FOLLICULAR LYMPHOMA GRADE III, UNSPECIFIED, INTRA-ABDOMINAL LYMPH NODES: ICD-10-CM

## 2022-10-07 DIAGNOSIS — Z98.89 OTHER SPECIFIED POSTPROCEDURAL STATES: Chronic | ICD-10-CM

## 2022-10-07 DIAGNOSIS — C84.70 ANAPLASTIC LARGE CELL LYMPHOMA, ALK-NEGATIVE, UNSPECIFIED SITE: ICD-10-CM

## 2022-10-07 PROCEDURE — 96401 CHEMO ANTI-NEOPL SQ/IM: CPT

## 2022-10-07 PROCEDURE — 96375 TX/PRO/DX INJ NEW DRUG ADDON: CPT

## 2022-10-07 PROCEDURE — 96409 CHEMO IV PUSH SNGL DRUG: CPT

## 2022-10-07 PROCEDURE — 96377 APPLICATON ON-BODY INJECTOR: CPT

## 2022-10-07 RX ORDER — BENDAMUSTINE HYDROCHLORIDE 100 MG/20ML
130 INJECTION, POWDER, LYOPHILIZED, FOR SOLUTION INTRAVENOUS ONCE
Refills: 0 | Status: COMPLETED | OUTPATIENT
Start: 2022-10-07 | End: 2022-10-07

## 2022-10-07 RX ORDER — ONDANSETRON 8 MG/1
16 TABLET, FILM COATED ORAL ONCE
Refills: 0 | Status: COMPLETED | OUTPATIENT
Start: 2022-10-07 | End: 2022-10-07

## 2022-10-07 RX ORDER — PEGFILGRASTIM-CBQV 6 MG/.6ML
6 INJECTION, SOLUTION SUBCUTANEOUS ONCE
Refills: 0 | Status: COMPLETED | OUTPATIENT
Start: 2022-10-07 | End: 2022-10-07

## 2022-10-07 RX ADMIN — ONDANSETRON 16 MILLIGRAM(S): 8 TABLET, FILM COATED ORAL at 11:26

## 2022-10-07 RX ADMIN — BENDAMUSTINE HYDROCHLORIDE 130 MILLIGRAM(S): 100 INJECTION, POWDER, LYOPHILIZED, FOR SOLUTION INTRAVENOUS at 11:42

## 2022-10-07 RX ADMIN — PEGFILGRASTIM-CBQV 6 MILLIGRAM(S): 6 INJECTION, SOLUTION SUBCUTANEOUS at 11:56

## 2022-10-07 RX ADMIN — BENDAMUSTINE HYDROCHLORIDE 130 MILLIGRAM(S): 100 INJECTION, POWDER, LYOPHILIZED, FOR SOLUTION INTRAVENOUS at 11:32

## 2022-10-07 RX ADMIN — ONDANSETRON 232 MILLIGRAM(S): 8 TABLET, FILM COATED ORAL at 11:11

## 2022-10-11 ENCOUNTER — APPOINTMENT (OUTPATIENT)
Dept: HEMATOLOGY ONCOLOGY | Facility: CLINIC | Age: 67
End: 2022-10-11

## 2022-10-11 ENCOUNTER — NON-APPOINTMENT (OUTPATIENT)
Age: 67
End: 2022-10-11

## 2022-10-11 ENCOUNTER — INPATIENT (INPATIENT)
Facility: HOSPITAL | Age: 67
LOS: 0 days | Discharge: HOME HEALTH | DRG: 726 | End: 2022-10-12
Attending: HOSPITALIST
Payer: MEDICARE

## 2022-10-11 VITALS
OXYGEN SATURATION: 95 % | DIASTOLIC BLOOD PRESSURE: 81 MMHG | HEART RATE: 63 BPM | RESPIRATION RATE: 18 BRPM | WEIGHT: 164.02 LBS | SYSTOLIC BLOOD PRESSURE: 145 MMHG | HEIGHT: 67 IN | TEMPERATURE: 99 F

## 2022-10-11 DIAGNOSIS — R59.0 LOCALIZED ENLARGED LYMPH NODES: ICD-10-CM

## 2022-10-11 DIAGNOSIS — N40.0 BENIGN PROSTATIC HYPERPLASIA WITHOUT LOWER URINARY TRACT SYMPTOMS: ICD-10-CM

## 2022-10-11 DIAGNOSIS — C82.90 FOLLICULAR LYMPHOMA, UNSPECIFIED, UNSPECIFIED SITE: ICD-10-CM

## 2022-10-11 DIAGNOSIS — D64.9 ANEMIA, UNSPECIFIED: ICD-10-CM

## 2022-10-11 DIAGNOSIS — R58 HEMORRHAGE, NOT ELSEWHERE CLASSIFIED: ICD-10-CM

## 2022-10-11 DIAGNOSIS — Z98.89 OTHER SPECIFIED POSTPROCEDURAL STATES: Chronic | ICD-10-CM

## 2022-10-11 DIAGNOSIS — Z29.9 ENCOUNTER FOR PROPHYLACTIC MEASURES, UNSPECIFIED: ICD-10-CM

## 2022-10-11 DIAGNOSIS — J45.20 MILD INTERMITTENT ASTHMA, UNCOMPLICATED: ICD-10-CM

## 2022-10-11 DIAGNOSIS — D72.829 ELEVATED WHITE BLOOD CELL COUNT, UNSPECIFIED: ICD-10-CM

## 2022-10-11 DIAGNOSIS — R33.9 RETENTION OF URINE, UNSPECIFIED: ICD-10-CM

## 2022-10-11 LAB
ALBUMIN SERPL ELPH-MCNC: 3.9 G/DL — SIGNIFICANT CHANGE UP (ref 3.3–5)
ALP SERPL-CCNC: 154 U/L — HIGH (ref 40–120)
ALT FLD-CCNC: 11 U/L — SIGNIFICANT CHANGE UP (ref 10–45)
ANION GAP SERPL CALC-SCNC: 11 MMOL/L — SIGNIFICANT CHANGE UP (ref 5–17)
ANISOCYTOSIS BLD QL: SIGNIFICANT CHANGE UP
APPEARANCE UR: CLEAR — SIGNIFICANT CHANGE UP
APTT BLD: 34.9 SEC — SIGNIFICANT CHANGE UP (ref 27.5–35.5)
AST SERPL-CCNC: 17 U/L — SIGNIFICANT CHANGE UP (ref 10–40)
BACTERIA # UR AUTO: SIGNIFICANT CHANGE UP /HPF
BASOPHILS # BLD AUTO: 0 K/UL — SIGNIFICANT CHANGE UP (ref 0–0.2)
BASOPHILS NFR BLD AUTO: 0 % — SIGNIFICANT CHANGE UP (ref 0–2)
BILIRUB SERPL-MCNC: 0.3 MG/DL — SIGNIFICANT CHANGE UP (ref 0.2–1.2)
BILIRUB UR-MCNC: NEGATIVE — SIGNIFICANT CHANGE UP
BUN SERPL-MCNC: 13 MG/DL — SIGNIFICANT CHANGE UP (ref 7–23)
CALCIUM SERPL-MCNC: 9.7 MG/DL — SIGNIFICANT CHANGE UP (ref 8.4–10.5)
CHLORIDE SERPL-SCNC: 106 MMOL/L — SIGNIFICANT CHANGE UP (ref 96–108)
CO2 SERPL-SCNC: 28 MMOL/L — SIGNIFICANT CHANGE UP (ref 22–31)
COLOR SPEC: YELLOW — SIGNIFICANT CHANGE UP
CREAT SERPL-MCNC: 0.99 MG/DL — SIGNIFICANT CHANGE UP (ref 0.5–1.3)
DACRYOCYTES BLD QL SMEAR: SLIGHT — SIGNIFICANT CHANGE UP
DIFF PNL FLD: ABNORMAL
EGFR: 83 ML/MIN/1.73M2 — SIGNIFICANT CHANGE UP
EOSINOPHIL # BLD AUTO: 0 K/UL — SIGNIFICANT CHANGE UP (ref 0–0.5)
EOSINOPHIL NFR BLD AUTO: 0 % — SIGNIFICANT CHANGE UP (ref 0–6)
EPI CELLS # UR: SIGNIFICANT CHANGE UP /HPF (ref 0–5)
GLUCOSE SERPL-MCNC: 101 MG/DL — HIGH (ref 70–99)
GLUCOSE UR QL: NEGATIVE — SIGNIFICANT CHANGE UP
HCT VFR BLD CALC: 35.8 % — LOW (ref 39–50)
HCT VFR BLD CALC: 35.8 % — LOW (ref 39–50)
HGB BLD-MCNC: 11.1 G/DL — LOW (ref 13–17)
HGB BLD-MCNC: 11.4 G/DL — LOW (ref 13–17)
HYPOCHROMIA BLD QL: SLIGHT — SIGNIFICANT CHANGE UP
INR BLD: 1.37 — HIGH (ref 0.88–1.16)
KETONES UR-MCNC: NEGATIVE — SIGNIFICANT CHANGE UP
LEUKOCYTE ESTERASE UR-ACNC: NEGATIVE — SIGNIFICANT CHANGE UP
LYMPHOCYTES # BLD AUTO: 0 % — LOW (ref 13–44)
LYMPHOCYTES # BLD AUTO: 0 K/UL — LOW (ref 1–3.3)
MACROCYTES BLD QL: SLIGHT — SIGNIFICANT CHANGE UP
MANUAL SMEAR VERIFICATION: SIGNIFICANT CHANGE UP
MCHC RBC-ENTMCNC: 28.2 PG — SIGNIFICANT CHANGE UP (ref 27–34)
MCHC RBC-ENTMCNC: 28.4 PG — SIGNIFICANT CHANGE UP (ref 27–34)
MCHC RBC-ENTMCNC: 31 GM/DL — LOW (ref 32–36)
MCHC RBC-ENTMCNC: 31.8 GM/DL — LOW (ref 32–36)
MCV RBC AUTO: 89.1 FL — SIGNIFICANT CHANGE UP (ref 80–100)
MCV RBC AUTO: 91.1 FL — SIGNIFICANT CHANGE UP (ref 80–100)
MICROCYTES BLD QL: SLIGHT — SIGNIFICANT CHANGE UP
MONOCYTES # BLD AUTO: 2.68 K/UL — HIGH (ref 0–0.9)
MONOCYTES NFR BLD AUTO: 4.4 % — SIGNIFICANT CHANGE UP (ref 2–14)
MYELOCYTES NFR BLD: 1.7 % — HIGH (ref 0–0)
NEUTROPHILS # BLD AUTO: 57.3 K/UL — HIGH (ref 1.8–7.4)
NEUTROPHILS NFR BLD AUTO: 93.9 % — HIGH (ref 43–77)
NITRITE UR-MCNC: POSITIVE
NRBC # BLD: 0 /100 WBCS — SIGNIFICANT CHANGE UP (ref 0–0)
OVALOCYTES BLD QL SMEAR: SLIGHT — SIGNIFICANT CHANGE UP
PH UR: 6.5 — SIGNIFICANT CHANGE UP (ref 5–8)
PLAT MORPH BLD: NORMAL — SIGNIFICANT CHANGE UP
PLATELET # BLD AUTO: 297 K/UL — SIGNIFICANT CHANGE UP (ref 150–400)
PLATELET # BLD AUTO: 334 K/UL — SIGNIFICANT CHANGE UP (ref 150–400)
POIKILOCYTOSIS BLD QL AUTO: SIGNIFICANT CHANGE UP
POLYCHROMASIA BLD QL SMEAR: SLIGHT — SIGNIFICANT CHANGE UP
POTASSIUM SERPL-MCNC: 4.4 MMOL/L — SIGNIFICANT CHANGE UP (ref 3.5–5.3)
POTASSIUM SERPL-SCNC: 4.4 MMOL/L — SIGNIFICANT CHANGE UP (ref 3.5–5.3)
PROT SERPL-MCNC: 6.4 G/DL — SIGNIFICANT CHANGE UP (ref 6–8.3)
PROT UR-MCNC: NEGATIVE MG/DL — SIGNIFICANT CHANGE UP
PROTHROM AB SERPL-ACNC: 16.3 SEC — HIGH (ref 10.5–13.4)
RBC # BLD: 3.93 M/UL — LOW (ref 4.2–5.8)
RBC # BLD: 4.02 M/UL — LOW (ref 4.2–5.8)
RBC # FLD: 20.5 % — HIGH (ref 10.3–14.5)
RBC # FLD: 20.8 % — HIGH (ref 10.3–14.5)
RBC BLD AUTO: ABNORMAL
RBC CASTS # UR COMP ASSIST: < 5 /HPF — SIGNIFICANT CHANGE UP
SARS-COV-2 RNA SPEC QL NAA+PROBE: NEGATIVE — SIGNIFICANT CHANGE UP
SODIUM SERPL-SCNC: 145 MMOL/L — SIGNIFICANT CHANGE UP (ref 135–145)
SP GR SPEC: 1.01 — SIGNIFICANT CHANGE UP (ref 1–1.03)
SPHEROCYTES BLD QL SMEAR: SLIGHT — SIGNIFICANT CHANGE UP
UROBILINOGEN FLD QL: 0.2 E.U./DL — SIGNIFICANT CHANGE UP
WBC # BLD: 44.63 K/UL — CRITICAL HIGH (ref 3.8–10.5)
WBC # BLD: 61.02 K/UL — CRITICAL HIGH (ref 3.8–10.5)
WBC # FLD AUTO: 44.63 K/UL — CRITICAL HIGH (ref 3.8–10.5)
WBC # FLD AUTO: 61.02 K/UL — CRITICAL HIGH (ref 3.8–10.5)
WBC UR QL: < 5 /HPF — SIGNIFICANT CHANGE UP

## 2022-10-11 PROCEDURE — 99221 1ST HOSP IP/OBS SF/LOW 40: CPT | Mod: GC

## 2022-10-11 PROCEDURE — 71260 CT THORAX DX C+: CPT | Mod: 26,MA

## 2022-10-11 PROCEDURE — 99223 1ST HOSP IP/OBS HIGH 75: CPT | Mod: GC

## 2022-10-11 PROCEDURE — 99285 EMERGENCY DEPT VISIT HI MDM: CPT

## 2022-10-11 PROCEDURE — 74177 CT ABD & PELVIS W/CONTRAST: CPT | Mod: 26,MA

## 2022-10-11 PROCEDURE — 99222 1ST HOSP IP/OBS MODERATE 55: CPT

## 2022-10-11 PROCEDURE — ZZZZZ: CPT

## 2022-10-11 RX ORDER — ALBUTEROL 90 UG/1
2 AEROSOL, METERED ORAL EVERY 6 HOURS
Refills: 0 | Status: DISCONTINUED | OUTPATIENT
Start: 2022-10-11 | End: 2022-10-12

## 2022-10-11 RX ORDER — TAMSULOSIN HYDROCHLORIDE 0.4 MG/1
0.4 CAPSULE ORAL AT BEDTIME
Refills: 0 | Status: DISCONTINUED | OUTPATIENT
Start: 2022-10-11 | End: 2022-10-12

## 2022-10-11 RX ORDER — TAMSULOSIN HYDROCHLORIDE 0.4 MG/1
1 CAPSULE ORAL
Qty: 0 | Refills: 0 | DISCHARGE

## 2022-10-11 RX ORDER — CEFPODOXIME PROXETIL 100 MG
1 TABLET ORAL
Qty: 20 | Refills: 0
Start: 2022-10-11 | End: 2022-10-20

## 2022-10-11 RX ORDER — CEFTRIAXONE 500 MG/1
1000 INJECTION, POWDER, FOR SOLUTION INTRAMUSCULAR; INTRAVENOUS ONCE
Refills: 0 | Status: COMPLETED | OUTPATIENT
Start: 2022-10-11 | End: 2022-10-11

## 2022-10-11 RX ORDER — LEVALBUTEROL 1.25 MG/.5ML
2 SOLUTION, CONCENTRATE RESPIRATORY (INHALATION)
Qty: 0 | Refills: 0 | DISCHARGE

## 2022-10-11 RX ADMIN — TAMSULOSIN HYDROCHLORIDE 0.4 MILLIGRAM(S): 0.4 CAPSULE ORAL at 21:46

## 2022-10-11 RX ADMIN — CEFTRIAXONE 100 MILLIGRAM(S): 500 INJECTION, POWDER, FOR SOLUTION INTRAMUSCULAR; INTRAVENOUS at 12:00

## 2022-10-11 NOTE — H&P ADULT - PROBLEM SELECTOR PLAN 8
Electrolytes: Mg>2, K>4  Nutrition: regular diet  Prophylaxis: hold concern for bleed  Activity: AAT, OOBTC  GI: none  C: FC  Dispo: Admit to F On levalbuterol at home prn, uses once a week. Not in acute exacerbation.   - albuterol prn as inpatient

## 2022-10-11 NOTE — ED PROVIDER NOTE - PROGRESS NOTE DETAILS
pt feeling much better.  voiding with schwarz.  no pain.  denies trauma.  reviewed scan with patient and Dr. Harper.  pt wants discharge and no clinical evidence of active bleeding present.  Given above will dc, tx with abx, close discharge - informed patient to F/U Dr. Harper tomorrow per her and continue to monitor symptoms.  Return for abdominal pain, back pain, weakness, fever, chills. Dr. Harper called back stating that in consultation with second radiologist she now recommends patient stay for serial Hb and exams.  She suggests may or may not be related to lymphoma and or chemo.  Pt is has no symptoms, is very stable from HD and HB stand point and given such, both Dr. Harper and I agree patient can be admitted to [facility].  Pt had some back pain 1 month ago and thought was sciatica - ? chronic bleed.   She already went ahead and discussed with Dr. Guaman who will consult on patient while on medical service.  Pt and family updated and ok with plan.

## 2022-10-11 NOTE — ED PROVIDER NOTE - CLINICAL SUMMARY MEDICAL DECISION MAKING FREE TEXT BOX
SS of urinary retention +/- infection.  ? related to BPH hx.  PVR >150 will need foly.  Plan check urine.  Given hx of lymphoma spoke with Dr. Harper who rec addn imaging r/o lymphoma mass compression of bladder outlet.  Pt stable, non septic.

## 2022-10-11 NOTE — H&P ADULT - PROBLEM SELECTOR PLAN 5
see above Likely due to constipation and BPH leading to increased frequency w incomplete emptying. Schwarz placed in ED. Now had large BM.   - keep schwarz in overnight  - remove in AM w TOV  - c/w tamsulosin 0.4gm daily  - f/u urology outpatient

## 2022-10-11 NOTE — ED PROVIDER NOTE - CARE PROVIDER_API CALL
Mckenzie Harper)  Internal Medicine  [address], [address] [place] [address]  Phone: [phone]  Fax: [phone]  Follow Up Time: 1-3 Days    Alba Torres  Urology  [address], [place] [address]  Phone: [phone]  Fax: [phone]  Follow Up Time: 1-3 Days

## 2022-10-11 NOTE — CONSULT NOTE ADULT - SUBJECTIVE AND OBJECTIVE BOX
HPI:  HPI: 67 y/o M PMH follicular lymphoma dx in  on chemo, BPH, mild intermittent asthma presents w increased urinary frequency and dysuria. Saturday afternoon he started going to the bathroom more than usual and noticed a burning sensation when urinating. He felt like he wasn't completely emptying his bladder and stopped drinking as much water, afraid that he would need to pee more. He hasn't been able to sleep during this period because of nocturia. He has had constipation over this time too with infrequent hard stools. He has had a UTI in the past but did not have this incomplete emptying. He denies any flank pain, fevers, chills, melena, hematochezia, abd pain, CP, SOB.    He follows w Dr. Harper for his follicular lymphoma which is grade III and last had chemo last Thursday 10/6 and Friday 10/7. Chemo regimen bendamustine and rituximab    In the ED:  Initial vital signs: T: 98.8 F, HR: 63, BP: 145/81, R: 18, SpO2: 95% on RA  ED course:   Labs: significant for  Imaging:   CT A/P: showing new heterogeneity of RP LN w dense likely hemorrhagic foci. Small R pleural effusion  Medications: CTX 1 g  Consults: none     Pt had schwarz placed in ED and a large BM.   (11 Oct 2022 19:14)      PAST MEDICAL & SURGICAL HISTORY:  BPH (benign prostatic hypertrophy)      Lymphoma  s/p chemotherapy, XRT to abdomen      Major depressive disorder      Prediabetes      Mild persistent asthma with acute exacerbation      H/O knee surgery           Review of Systems:  · General	negative  · Skin/Breast	negative  · Ophthalmologic	negative  · ENMT	negative  · Respiratory and Thorax	negative  · Cardiovascular	negative  · Gastrointestinal	negative  · Genitourinary	negative  · Musculoskeletal Comments	negative  · Neurological	negative      MEDICATIONS  (STANDING):  tamsulosin 0.4 milliGRAM(s) Oral at bedtime    MEDICATIONS  (PRN):  ALBUTerol    90 MICROgram(s) HFA Inhaler 2 Puff(s) Inhalation every 6 hours PRN Shortness of Breath and/or Wheezing      Allergies    ibuprofen (Unknown)  naproxen (Short breath)  Shrimp (Unknown)    Intolerances        SOCIAL HISTORY:    FAMILY HISTORY:  No pertinent family history in first degree relatives        Vital Signs Last 24 Hrs  T(C): 36.8 (11 Oct 2022 19:37), Max: 37.1 (11 Oct 2022 09:53)  T(F): 98.2 (11 Oct 2022 19:37), Max: 98.8 (11 Oct 2022 09:53)  HR: 64 (11 Oct 2022 19:37) (58 - 70)  BP: 135/73 (11 Oct 2022 19:37) (133/75 - 145/81)  BP(mean): --  RR: 18 (11 Oct 2022 19:37) (18 - 18)  SpO2: 96% (11 Oct 2022 19:37) (95% - 96%)    Parameters below as of 11 Oct 2022 19:37  Patient On (Oxygen Delivery Method): room air         Physical Exam:  · Constitutional	detailed exam  · Constitutional Details	well-developed; well-groomed  · Eyes	EOMI; PERRL; no drainage or redness  · ENMT Comments	dry mucous membranes  · Respiratory	detailed exam  · Respiratory Comments	normal breath sounds at the lung bases bilaterally  · Cardiovascular	Regular rate & rhythm, normal S1, S2; no murmurs, gallops or rubs; no S3, S4  · Abd-Soft non tender  ·Ext-no edema, clubbing or cyanosis      LABS:                        11.4   61.02 )-----------( 334      ( 11 Oct 2022 11:37 )             35.8     10-11    145  |  106  |  13  ----------------------------<  101<H>  4.4   |  28  |  0.99    Ca    9.7      11 Oct 2022 11:37    TPro  6.4  /  Alb  3.9  /  TBili  0.3  /  DBili  x   /  AST  17  /  ALT  11  /  AlkPhos  154<H>  10-11      Urinalysis Basic - ( 11 Oct 2022 10:02 )    Color: Yellow / Appearance: Clear / S.010 / pH: x  Gluc: x / Ketone: NEGATIVE  / Bili: Negative / Urobili: 0.2 E.U./dL   Blood: x / Protein: NEGATIVE mg/dL / Nitrite: POSITIVE   Leuk Esterase: NEGATIVE / RBC: < 5 /HPF / WBC < 5 /HPF   Sq Epi: x / Non Sq Epi: 0-5 /HPF / Bacteria: None /HPF        RADIOLOGY & ADDITIONAL STUDIES:

## 2022-10-11 NOTE — H&P ADULT - PROBLEM SELECTOR PLAN 1
CT showing  new hemorrhagic foci, abdomniopelvic fat infiltration w fascial thickening possibly from hemorrhage. Could be from chemotherapy. Low suspicion of active bleed. No back pain, hemoglobin at baseline, HD stable. Dr. Harper saw in ED and recommended to monitor overnight and check hgb in the AM.   - monitor vitals  - check hgb in am  - active T+S  - f/u CT as outpatient CT showing  new hemorrhagic foci, abdomniopelvic fat infiltration w fascial thickening possibly from hemorrhage. Low suspicion of active bleed. No back pain, hemoglobin at baseline, HD stable. Dr. Harper saw in ED and recommended to monitor overnight and check hgb in the AM.   - monitor vitals  - check hgb in am  - active T+S  - f/u CT as outpatient CT showing new hemorrhagic foci, abdomniopelvic fat infiltration w fascial thickening possibly from hemorrhage.  Low suspicion of active bleed but unclear if findings are from previously bleed. No back pain, hemoglobin at baseline, HD stable. No trauma and not on AC. Dr. Harper saw in ED and recommended to monitor overnight and check hgb in the AM. Surgery consulted for possible intervention.  - f/u surgery recs  - monitor vitals  - check hgb in am  - active T+S  - f/u CT as outpatient

## 2022-10-11 NOTE — ED ADULT NURSE NOTE - OBJECTIVE STATEMENT
66y M, presents to the ED c/o dysuria and increased urinary frequency since Saturday. Pt also reports Hx of lymphoma. Pt A&Ox4, ambulatory with steady gait, speaking in clear/full sentences, vital signs stable. 66y M, presents to the ED c/o dysuria and increased urinary frequency since Saturday. Pt also reports Hx of lymphoma. Denies chest pain, SOB, NVD, fever, chills. Pt A&Ox4, ambulatory with steady gait, speaking in clear/full sentences, vital signs stable.

## 2022-10-11 NOTE — H&P ADULT - PROBLEM SELECTOR PLAN 2
Diagnosed in 2012, receiving chemotherapy w Dr. Harper, bendamustine and rituximab last on 10/6 and 10/7. Likely cause of hemorrhagic LN and RP bleed.  - f/u w Dr. Harper outpatient  - if pt ends up staying longer f/u recs w Dr. Harper CT showing new hemorrhagic foci, abdomniopelvic fat infiltration w fascial thickening possibly from hemorrhage.  Low suspicion of active bleed but unclear if findings are from previously bleed. No back pain, hemoglobin at baseline, HD stable. No trauma and not on AC. Dr. Harper saw in ED and recommended to monitor overnight and check hgb in the AM. Surgery consulted for possible intervention.  - f/u surgery recs  - monitor vitals  - check hgb in am  - active T+S  - f/u CT as outpatient Diagnosed in 2012, receiving chemotherapy w Dr. Harper, bendamustine and rituximab last on 10/6 and 10/7. Likely cause of hemorrhagic LN and RP bleed.  - f/u heme/onc recs  - if pt ends up staying longer f/u recs w Dr. Harper

## 2022-10-11 NOTE — H&P ADULT - PROBLEM SELECTOR PLAN 9
Electrolytes: Mg>2, K>4  Nutrition: regular diet  Prophylaxis: hold concern for bleed  Activity: AAT, OOBTC  GI: none  C: FC  Dispo: Admit to F

## 2022-10-11 NOTE — H&P ADULT - ATTENDING COMMENTS
#r/o RP bleed: CT scan findings of new heterogeneity of retroperitoneal lymph nodes with dense, possibly hemorrhagic foci and new abdominopelvic fat infiltration and fascial thickening, concerning for retroperitoneal hemorrhagic lymph nodes; no signs of active bleed, pt HDS, hgb stable x2. Surgery consulted, no intervention, continue to trend cbc, active TS, 2 large bore IVs.      #Lymphoma: plan as above. Heme following

## 2022-10-11 NOTE — H&P ADULT - PROBLEM SELECTOR PROBLEM 5
Pt is in the shower now.  Appears tremulous offered and gave vistaril 50mg BPH (benign prostatic hyperplasia) Acute on chronic urinary retention

## 2022-10-11 NOTE — H&P ADULT - PROBLEM SELECTOR PLAN 3
Likely from lymphoma. WBC 60. WBC on 10/6 befor recent chemo session 9.3, and on 8/29 was 44. Although pt had increased urinary frequency and dysuria, UA negative for pyuria. Sx likely due to acute on chronic retention (see below). No cough, SOB, diarrhea.  - no acute intervention Diagnosed in 2012, receiving chemotherapy w Dr. Harper, bendamustine and rituximab last on 10/6 and 10/7. Likely cause of hemorrhagic LN and RP bleed.  - f/u heme/onc recs  - if pt ends up staying longer f/u recs w Dr. Harper Likely from lymphoma. WBC 60. WBC on 10/6 befor recent chemo session 9.3, and on 8/29 was 44. Although pt had increased urinary frequency and dysuria, UA negative for pyuria. Sx likely due to acute on chronic retention (see below). No cough, SOB, diarrhea. Received 1g CTX in ED  - hold abx for now   - f/u urine cx

## 2022-10-11 NOTE — CONSULT NOTE ADULT - ASSESSMENT
67 y/o M PMH follicular lymphoma dx in 2012 on chemo, BPH, mild intermittent asthma presents w increased urinary frequency and dysuria. Saturday afternoon he started going to the bathroom more than usual and noticed a burning sensation when urinating as well as some constipation. In ED afebrile and hemodynamically stable, WBC 61K, Hb 11.4 (11.5 on 10/4). Surgery consulted for CT scan findings of new heterogeneity of retroperitoneal lymph nodes with dense, possibly hemorrhagic foci and new abdominopelvic fat infiltration and fascial thickening, concerning for retroperitoneal hemorrhagic lymph nodes.    Plan:  - No acute surgical intervention at this time  - Trend CBC  - Awaiting final plan****  67 y/o M PMH follicular lymphoma dx in 2012 on chemo, BPH, mild intermittent asthma presents w increased urinary frequency and dysuria. Saturday afternoon he started going to the bathroom more than usual and noticed a burning sensation when urinating as well as some constipation. In ED afebrile and hemodynamically stable, WBC 61K, Hb 11.4 (11.5 on 10/4). Surgery consulted for CT scan findings of new heterogeneity of retroperitoneal lymph nodes with dense, possibly hemorrhagic foci and new abdominopelvic fat infiltration and fascial thickening, concerning for retroperitoneal hemorrhagic lymph nodes.    Plan:  - No acute surgical intervention at this time  - Trend CBC  - Active type and screen, coags  - Awaiting final plan****  67 y/o M PMH follicular lymphoma dx in 2012 on chemo, BPH, mild intermittent asthma presents w increased urinary frequency and dysuria. Saturday afternoon he started going to the bathroom more than usual and noticed a burning sensation when urinating as well as some constipation. In ED afebrile and hemodynamically stable, WBC 61K, Hb 11.4 (11.5 on 10/4). Surgery consulted for CT scan findings of new heterogeneity of retroperitoneal lymph nodes with dense, possibly hemorrhagic foci and new abdominopelvic fat infiltration and fascial thickening, concerning for retroperitoneal hemorrhagic lymph nodes.     Plan:  - No acute surgical intervention at this time - low suspicion for active bleeding  - Trend CBC  - Maintain active type and screen, coags    Plan discussed with chief resident on call and Dr. Cuevas

## 2022-10-11 NOTE — H&P ADULT - NSHPSOCIALHISTORY_GEN_ALL_CORE
Work: retired, used to work in [place] housing, was in   Tobacco use: less than 1 pack year in the 80s  EtOH use: none  Illicit drug use: none    Living situation: son lives with him

## 2022-10-11 NOTE — H&P ADULT - HISTORY OF PRESENT ILLNESS
HPI: 67 y/o M PMH follicular lymphoma dx in 2012 on chemo, BPH, mild intermittent asthma presents w increased urinary frequency and dysuria. Saturday afternoon he started going to the bathroom more than usual and noticed a burning sensation when urinating. He felt like he wasn't completely emptying his bladder and stopped drinking as much water, afraid that he would need to pee more. He hasn't been able to sleep during this period because of nocturia. He has had constipation over this time too with infrequent hard stools. He has had a UTI in the past but did not have this incomplete emptying. He denies any flank pain, fevers, chills, melena, hematochezia, abd pain, CP, SOB.    He follows w Dr. Harper for his follicular lymphoma which is grade III and last had chemo last Thursday 10/6 and Friday 10/7. Chemo regimen bendamustine and rituximab    In the ED:  Initial vital signs: T: 98.8 F, HR: 63, BP: 145/81, R: 18, SpO2: 95% on RA  ED course:   Labs: significant for  Imaging:   CT A/P: showing new heterogeneity of RP LN w dense likely hemorrhagic foci. Small R pleural effusion  Medications: CTX 1 g  Consults: none     Pt had schwarz placed in ED and a large BM.   HPI: 67 y/o M PMH follicular lymphoma dx in 2012 on chemo, BPH, mild intermittent asthma presents w increased urinary frequency and dysuria. Saturday afternoon he started going to the bathroom more than usual and noticed a burning sensation when urinating. He felt like he wasn't completely emptying his bladder and stopped drinking as much water, afraid that he would need to pee more. He hasn't been able to sleep during this period because of nocturia. He has had constipation over this time too with infrequent hard stools. He has had a UTI in the past but did not have this incomplete emptying. He denies any flank pain, fevers, chills, melena, hematochezia, abd pain, CP, SOB.    He follows w Dr. Harper for his follicular lymphoma which is grade III and last had chemo last Thursday 10/6 and Friday 10/7. Chemo regimen bendamustine and rituximab    In the ED:  Initial vital signs: T: 98.8 F, HR: 63, BP: 145/81, R: 18, SpO2: 95% on RA  ED course:   Labs: significant for wbc 61 (9.3 on 10/6, 44 on 8/29), hgb 11.4 (baseline 10.8-11.5), , AST 17, ALT 11, UA w no pyuria  Imaging:   CT A/P: showing new heterogeneity of RP LN w dense likely hemorrhagic foci. abdominopelvic fat infiltration and fascial thickening probably sequela of bleed. L diaphragmatic nodules probably blood products. Small R pleural effusion  Medications: CTX 1 g  Consults: none     Pt had schwarz placed in ED and a large BM.

## 2022-10-11 NOTE — ED ADULT NURSE NOTE - ISAR MEMORY
How Severe Are Your Spot(S)?: mild Have Your Spot(S) Been Treated In The Past?: has not been treated Hpi Title: Evaluation of Skin Lesions Location: Right upper chest Year Removed: 2011 No

## 2022-10-11 NOTE — CONSULT NOTE ADULT - SUBJECTIVE AND OBJECTIVE BOX
Surgery Consult Note    HPI:   65 y/o M PMH follicular lymphoma dx in  on chemo, BPH, mild intermittent asthma presents w increased urinary frequency and dysuria. Saturday afternoon he started going to the bathroom more than usual and noticed a burning sensation when urinating. He felt like he wasn't completely emptying his bladder and stopped drinking as much water, afraid that he would need to pee more. He hasn't been able to sleep during this period because of nocturia. He has had constipation over this time too with infrequent hard stools. He has had a UTI in the past but did not have this incomplete emptying. He reports lower abdominal pain for the past 2 days that has been associated with straining and urinating. Denies nausea/vomiting. This morning had a soft BM and reports  abdominal pain has improved since then. He denies any flank pain, fevers, chills, melena, hematochezia, abd pain, CP, SOB. Reports feeling occasionally lightheaded but that quickly resolves. Denies any blood in his stool or urine    He follows w Dr. Harper for his follicular lymphoma which is grade III and last had chemo last Thursday 10/6 and Friday 10/7. Chemo regimen bendamustine and rituximab    ED: afebrile, VSS. WBC 61K, Hb 11.4 (11.5 on 10/4). Since 2022, decreased retroperitoneal and left axillary adenopathy. New heterogeneity of retroperitoneal lymph nodes with dense, possibly hemorrhagic foci and new abdominopelvic fat infiltration and fascial thickening, probably sequela of hemorrhage, possibly treatment related. New left diaphragmatic nodular thickening, possibly due to blood products.     PMH: follicular lymphoma dx in  on chemo, BPH, mild intermittent asthma  PSH: L knee surgery  Social Hx: no ETOH, no cigaretts, no drug use  Family Hx: Denies family hx of IBS, Crohn's, UC, or colon cancer.      Attending:  Dr. Cuevas          PAST MEDICAL & SURGICAL HISTORY:  BPH (benign prostatic hypertrophy)      Lymphoma  s/p chemotherapy, XRT to abdomen      Major depressive disorder      Prediabetes      Mild persistent asthma with acute exacerbation      H/O knee surgery          MEDICATIONS  (STANDING):  tamsulosin 0.4 milliGRAM(s) Oral at bedtime    MEDICATIONS  (PRN):  ALBUTerol    90 MICROgram(s) HFA Inhaler 2 Puff(s) Inhalation every 6 hours PRN Shortness of Breath and/or Wheezing      Allergies    ibuprofen (Unknown)  naproxen (Short breath)  Shrimp (Unknown)    Intolerances        SOCIAL HISTORY:    FAMILY HISTORY:  No pertinent family history in first degree relatives        Vital Signs Last 24 Hrs  T(C): 36.9 (11 Oct 2022 21:50), Max: 37.1 (11 Oct 2022 09:53)  T(F): 98.5 (11 Oct 2022 21:50), Max: 98.8 (11 Oct 2022 09:53)  HR: 57 (11 Oct 2022 21:50) (57 - 70)  BP: 159/78 (11 Oct 2022 21:50) (133/75 - 159/78)  BP(mean): --  RR: 17 (11 Oct 2022 21:50) (17 - 18)  SpO2: 97% (11 Oct 2022 21:50) (95% - 97%)    Parameters below as of 11 Oct 2022 21:50  Patient On (Oxygen Delivery Method): room air        I&O's Summary    11 Oct 2022 07:01  -  11 Oct 2022 23:27  --------------------------------------------------------  IN: 0 mL / OUT: 2630 mL / NET: -2630 mL        Physical Exam:  General: NAD, resting comfortably  HEENT: NC/AT  Pulmonary: normal resp effort  Cardiovascular: NSR,  Abdominal: soft, ND, NT, no CVA tenderness, no hematomas  Extremities: WWP, normal strength, no clubbing/cyanosis/edema  Neuro: A/O x 3, no focal deficits      Lines/drains/tubes:    LABS:                        11.1   44.63 )-----------( 297      ( 11 Oct 2022 22:58 )             35.8     10-11    145  |  106  |  13  ----------------------------<  101<H>  4.4   |  28  |  0.99    Ca    9.7      11 Oct 2022 11:37    TPro  6.4  /  Alb  3.9  /  TBili  0.3  /  DBili  x   /  AST  17  /  ALT  11  /  AlkPhos  154<H>  10-11    PT/INR - ( 11 Oct 2022 22:58 )   PT: 16.3 sec;   INR: 1.37          PTT - ( 11 Oct 2022 22:58 )  PTT:34.9 sec  Urinalysis Basic - ( 11 Oct 2022 10:02 )    Color: Yellow / Appearance: Clear / S.010 / pH: x  Gluc: x / Ketone: NEGATIVE  / Bili: Negative / Urobili: 0.2 E.U./dL   Blood: x / Protein: NEGATIVE mg/dL / Nitrite: POSITIVE   Leuk Esterase: NEGATIVE / RBC: < 5 /HPF / WBC < 5 /HPF   Sq Epi: x / Non Sq Epi: 0-5 /HPF / Bacteria: None /HPF      CAPILLARY BLOOD GLUCOSE      POCT Blood Glucose.: 100 mg/dL (11 Oct 2022 09:54)    LIVER FUNCTIONS - ( 11 Oct 2022 11:37 )  Alb: 3.9 g/dL / Pro: 6.4 g/dL / ALK PHOS: 154 U/L / ALT: 11 U/L / AST: 17 U/L / GGT: x             Cultures:      RADIOLOGY & ADDITIONAL STUDIES:

## 2022-10-11 NOTE — H&P ADULT - PROBLEM SELECTOR PLAN 6
Could be anemia of chronic disease in setting of lymphoma, follows w Dr. Harper. Currently 11.4 baseline between 10.8-11.5. HD stable  - active T+S  - repeat hgb in AM  - f/u w Dr. Harper see above

## 2022-10-11 NOTE — ED PROVIDER NOTE - NSFOLLOWUPINSTRUCTIONS_ED_ALL_ED_FT
Return for abdominal pain, back pain, weakness, fever, chills.    Urinary Tract Infection    A urinary tract infection (UTI) is an infection of any part of the urinary tract, which includes the kidneys, ureters, bladder, and urethra. Risk factors include ignoring your need to urinate, wiping back to front if female, being an uncircumcised male, and having diabetes or a weak immune system. Symptoms include frequent urination, pain or burning with urination, foul smelling urine, cloudy urine, pain in the lower abdomen, blood in the urine, and fever. If you were prescribed an antibiotic medicine, take it as told by your health care provider. Do not stop taking the antibiotic even if you start to feel better.    SEEK IMMEDIATE MEDICAL CARE IF YOU HAVE ANY OF THE FOLLOWING SYMPTOMS: severe back or abdominal pain, fever, inability to keep fluids or medicine down, dizziness/lightheadedness, or a change in mental status.         Acute Urinary Retention, Male       Acute urinary retention is when a person cannot pee (urinate) at all, or can only pee a little. This can come on all of a sudden. If it is not treated, it can lead to kidney problems or other serious problems.      What are the causes?    •A problem with the tube that drains the bladder (urethra).      •Problems with the nerves in the bladder.      •Tumors.      •Certain medicines.      •An infection.      •Having trouble pooping (constipation).        What increases the risk?    Older men are more at risk because their prostate gland may become larger as they age. Other conditions also can increase risk. These include:  •Diseases, such as multiple sclerosis.      •Injury to the spinal cord.      •Diabetes.      •A condition that affects the way the brain works, such as dementia.      •Holding back urine due to trauma or because you do not want to use the bathroom.        What are the signs or symptoms?    •Trouble peeing.      •Pain in the lower belly.        How is this treated?    Treatment for this condition may include:  •Medicines.      •Placing a thin, germ-free tube (catheter) into the bladder to drain pee out of the body.      •Therapy to treat mental health conditions.      •Treatment for conditions that may cause this.      If needed, you may be treated in the hospital for kidney problems or to manage other problems.      Follow these instructions at home:    Medicines     •Take over-the-counter and prescription medicines only as told by your doctor. Ask your doctor what medicines you should stay away from.       •If you were given an antibiotic medicine, take it as told by your doctor. Do not stop taking it, even if you start to feel better.      General instructions     • Do not smoke or use any products that contain nicotine or tobacco. If you need help quitting, ask your doctor.      •Drink enough fluid to keep your pee pale yellow.      •If you were sent home with a tube that drains the bladder, take care of it as told by your doctor.      •Watch for changes in your symptoms. Tell your doctor about them.      •If told, keep track of changes in your blood pressure at home. Tell your doctor about them.      •Keep all follow-up visits.        Contact a doctor if:    •You have spasms in your bladder that you cannot stop.      •You leak pee when you have spasms.        Get help right away if:    •You have chills or a fever.      •You have blood in your pee.    •You have a tube that drains pee from the bladder and these things happen:  •The tube stops draining pee.      •The tube falls out.          Summary    •Acute urinary retention is when you cannot pee at all or you pee too little.      •If this condition is not treated, it can lead to kidney problems or other serious problems.      •If you were sent home with a tube (catheter) that drains the bladder, take care of it as told by your doctor.      •Watch for changes in your symptoms. Tell your doctor about them.      This information is not intended to replace advice given to you by your health care provider. Make sure you discuss any questions you have with your health care provider.

## 2022-10-11 NOTE — CONSULT NOTE ADULT - ASSESSMENT
68y/o with progressing follicular lymphoma was started on chemo with Bendamustine Rituxan, which he tolerates very well...missed his appointment to my office today, went to ER for urinary complaints, and CT scan of AP found to have large RP bleed, spontenous?    Pt denies trauma and is not taking AC...

## 2022-10-11 NOTE — H&P ADULT - NSHPLABSRESULTS_GEN_ALL_CORE
.  LABS:                         11.4   61.02 )-----------( 334      ( 11 Oct 2022 11:37 )             35.8     10-11    145  |  106  |  13  ----------------------------<  101<H>  4.4   |  28  |  0.99    Ca    9.7      11 Oct 2022 11:37    TPro  6.4  /  Alb  3.9  /  TBili  0.3  /  DBili  x   /  AST  17  /  ALT  11  /  AlkPhos  154<H>  10-11      Urinalysis Basic - ( 11 Oct 2022 10:02 )    Color: Yellow / Appearance: Clear / S.010 / pH: x  Gluc: x / Ketone: NEGATIVE  / Bili: Negative / Urobili: 0.2 E.U./dL   Blood: x / Protein: NEGATIVE mg/dL / Nitrite: POSITIVE   Leuk Esterase: NEGATIVE / RBC: < 5 /HPF / WBC < 5 /HPF   Sq Epi: x / Non Sq Epi: 0-5 /HPF / Bacteria: None /HPF            RADIOLOGY, EKG & ADDITIONAL TESTS: Reviewed.

## 2022-10-11 NOTE — ED ADULT NURSE NOTE - NSIMPLEMENTINTERV_GEN_ALL_ED
Implemented All Universal Safety Interventions:  [place] to call system. Call bell, personal items and telephone within reach. Instruct patient to call for assistance. Room bathroom lighting operational. Non-slip footwear when patient is off stretcher. Physically safe environment: no spills, clutter or unnecessary equipment. Stretcher in lowest position, wheels locked, appropriate side rails in place.

## 2022-10-11 NOTE — ED PROVIDER NOTE - CARE PROVIDERS DIRECT ADDRESSES
[email or web address]

## 2022-10-11 NOTE — H&P ADULT - PROBLEM SELECTOR PLAN 4
Likely due to constipation and BPH leading to increased frequency w incomplete emptying. Schwarz placed in ED. Now had large BM.   - keep schwarz in overnight  - remove in AM w TOV  - c/w tamsulosin 0.4gm daily  - f/u urology outpatient Likely from lymphoma. WBC 60. WBC on 10/6 befor recent chemo session 9.3, and on 8/29 was 44. Although pt had increased urinary frequency and dysuria, UA negative for pyuria. Sx likely due to acute on chronic retention (see below). No cough, SOB, diarrhea. Received 1g CTX in ED  - hold abx for now   - f/u urine cx

## 2022-10-11 NOTE — ED PROVIDER NOTE - PROVIDER TOKENS
PROVIDER:[TOKEN:[[id number]],FOLLOWUP:[1-3 Days]],PROVIDER:[TOKEN:[[id number]],FOLLOWUP:[1-3 Days]]

## 2022-10-11 NOTE — ED PROVIDER NOTE - OBJECTIVE STATEMENT
67 y/o Male with a PMHx of BPH, Lymphoma, Major depressive disorder, PreDM, mild persistent asthma with acute    exacerbation and a PSHx of knee surgery presenting to the ED stating he has been experiencing urinary frequency and    dysuria x5 days ago, Friday, which has been constant throughout the weekend prompting him to the ED today for further    evaluation. Pt notes he is unable to sleep due to the constant urge to urinate about every 10 minutes. Admits to having    a similar episode in the past and was found to have a UTI. Currently taking Tamsulosin, however, pt states it does not work. Patients' last chemo treatment were on 10/06 and 10/07.

## 2022-10-11 NOTE — H&P ADULT - NSHPPHYSICALEXAM_GEN_ALL_CORE
T(C): 37 (10-11-22 @ 15:59), Max: 37.1 (10-11-22 @ 09:53)  HR: 70 (10-11-22 @ 15:59) (58 - 70)  BP: 133/75 (10-11-22 @ 15:59) (133/75 - 145/81)  RR: 18 (10-11-22 @ 15:59) (18 - 18)  SpO2: 95% (10-11-22 @ 15:59) (95% - 96%)    GEN - Older M awake in bed w head of bed raised, comfortable appearing  HEENT - MMM, no scleral icterus  RESP - CTA BL, no increased work of breathing, on RA  CARDIO - NS1S2, RRR. No murmurs  ABD - mild distension, soft, nontender, normoactive BS  Ext - trace pretibial edema, WWP  Neuro - AOx3, moves all extremities spontaneously  Skin - clean, dry, intact. no rashes or lesions.    : schwarz in placed draining yellow urine T(C): 37 (10-11-22 @ 15:59), Max: 37.1 (10-11-22 @ 09:53)  HR: 70 (10-11-22 @ 15:59) (58 - 70)  BP: 133/75 (10-11-22 @ 15:59) (133/75 - 145/81)  RR: 18 (10-11-22 @ 15:59) (18 - 18)  SpO2: 95% (10-11-22 @ 15:59) (95% - 96%)    GEN - Older M awake in bed w head of bed raised, comfortable appearing  HEENT - MMM, no scleral icterus  RESP - CTA BL, no increased work of breathing, on RA  CARDIO - NS1S2, RRR. No murmurs  ABD - mild distension, soft, nontender, normoactive BS  Ext - trace pretibial edema, WWP  Neuro - AOx3, moves all extremities spontaneously  Skin - clean, dry, intact. no rashes or lesions.     - schwarz in placed draining yellow urine, no CVA tenderness

## 2022-10-11 NOTE — ED PROVIDER NOTE - PATIENT PORTAL LINK FT
You can access the FollowMyHealth Patient Portal offered by [facility] by registering at the following website: [email or web address] By joining "[employer]’s FollowMyHealth portal, you will also be able to view your health information using other applications (apps) compatible with our system.

## 2022-10-11 NOTE — H&P ADULT - ASSESSMENT
65 y/o M PMH follicular lymphoma dx in 2012 on chemo, BPH, mild intermittent asthma presents w increased urinary frequency and dysuria, found to have hemorrhagic RP LN and admitted fo hgb monitoring.

## 2022-10-11 NOTE — H&P ADULT - PROBLEM SELECTOR PLAN 7
On levalbuterol at home prn, uses once a week. Not in acute exacerbation.   - albuterol prn as inpatient Could be anemia of chronic disease in setting of lymphoma, follows w Dr. Harper. Currently 11.4 baseline between 10.8-11.5. HD stable  - active T+S  - repeat hgb in AM  - f/u w Dr. Harper

## 2022-10-12 ENCOUNTER — TRANSCRIPTION ENCOUNTER (OUTPATIENT)
Age: 67
End: 2022-10-12

## 2022-10-12 VITALS
SYSTOLIC BLOOD PRESSURE: 124 MMHG | RESPIRATION RATE: 17 BRPM | DIASTOLIC BLOOD PRESSURE: 75 MMHG | HEART RATE: 70 BPM | TEMPERATURE: 98 F | OXYGEN SATURATION: 97 %

## 2022-10-12 DIAGNOSIS — N40.0 BENIGN PROSTATIC HYPERPLASIA WITHOUT LOWER URINARY TRACT SYMPTOMS: ICD-10-CM

## 2022-10-12 LAB
ANION GAP SERPL CALC-SCNC: 12 MMOL/L — SIGNIFICANT CHANGE UP (ref 5–17)
BLD GP AB SCN SERPL QL: NEGATIVE — SIGNIFICANT CHANGE UP
BUN SERPL-MCNC: 14 MG/DL — SIGNIFICANT CHANGE UP (ref 7–23)
CALCIUM SERPL-MCNC: 9.3 MG/DL — SIGNIFICANT CHANGE UP (ref 8.4–10.5)
CHLORIDE SERPL-SCNC: 106 MMOL/L — SIGNIFICANT CHANGE UP (ref 96–108)
CO2 SERPL-SCNC: 27 MMOL/L — SIGNIFICANT CHANGE UP (ref 22–31)
CREAT SERPL-MCNC: 1.07 MG/DL — SIGNIFICANT CHANGE UP (ref 0.5–1.3)
EGFR: 76 ML/MIN/1.73M2 — SIGNIFICANT CHANGE UP
GLUCOSE SERPL-MCNC: 83 MG/DL — SIGNIFICANT CHANGE UP (ref 70–99)
HCT VFR BLD CALC: 36.3 % — LOW (ref 39–50)
HCV AB S/CO SERPL IA: 0.03 S/CO — SIGNIFICANT CHANGE UP
HCV AB SERPL-IMP: SIGNIFICANT CHANGE UP
HGB BLD-MCNC: 11.4 G/DL — LOW (ref 13–17)
MCHC RBC-ENTMCNC: 27.7 PG — SIGNIFICANT CHANGE UP (ref 27–34)
MCHC RBC-ENTMCNC: 31.4 GM/DL — LOW (ref 32–36)
MCV RBC AUTO: 88.3 FL — SIGNIFICANT CHANGE UP (ref 80–100)
NRBC # BLD: 0 /100 WBCS — SIGNIFICANT CHANGE UP (ref 0–0)
PLATELET # BLD AUTO: 314 K/UL — SIGNIFICANT CHANGE UP (ref 150–400)
POTASSIUM SERPL-MCNC: 4 MMOL/L — SIGNIFICANT CHANGE UP (ref 3.5–5.3)
POTASSIUM SERPL-SCNC: 4 MMOL/L — SIGNIFICANT CHANGE UP (ref 3.5–5.3)
RBC # BLD: 4.11 M/UL — LOW (ref 4.2–5.8)
RBC # FLD: 20.3 % — HIGH (ref 10.3–14.5)
RH IG SCN BLD-IMP: POSITIVE — SIGNIFICANT CHANGE UP
SODIUM SERPL-SCNC: 145 MMOL/L — SIGNIFICANT CHANGE UP (ref 135–145)
WBC # BLD: 29.6 K/UL — HIGH (ref 3.8–10.5)
WBC # FLD AUTO: 29.6 K/UL — HIGH (ref 3.8–10.5)

## 2022-10-12 PROCEDURE — 99231 SBSQ HOSP IP/OBS SF/LOW 25: CPT

## 2022-10-12 PROCEDURE — 99239 HOSP IP/OBS DSCHRG MGMT >30: CPT

## 2022-10-12 PROCEDURE — 99221 1ST HOSP IP/OBS SF/LOW 40: CPT

## 2022-10-12 RX ORDER — POLYETHYLENE GLYCOL 3350 17 G/17G
17 POWDER, FOR SOLUTION ORAL
Qty: 476 | Refills: 1
Start: 2022-10-12 | End: 2022-11-08

## 2022-10-12 RX ORDER — SENNA PLUS 8.6 MG/1
2 TABLET ORAL
Qty: 30 | Refills: 1
Start: 2022-10-12 | End: 2022-11-10

## 2022-10-12 RX ORDER — SENNA PLUS 8.6 MG/1
2 TABLET ORAL AT BEDTIME
Refills: 0 | Status: DISCONTINUED | OUTPATIENT
Start: 2022-10-12 | End: 2022-10-12

## 2022-10-12 RX ORDER — POLYETHYLENE GLYCOL 3350 17 G/17G
17 POWDER, FOR SOLUTION ORAL ONCE
Refills: 0 | Status: COMPLETED | OUTPATIENT
Start: 2022-10-12 | End: 2022-10-12

## 2022-10-12 RX ORDER — POLYETHYLENE GLYCOL 3350 17 G/17G
17 POWDER, FOR SOLUTION ORAL
Refills: 0 | Status: DISCONTINUED | OUTPATIENT
Start: 2022-10-12 | End: 2022-10-12

## 2022-10-12 RX ORDER — POLYETHYLENE GLYCOL 3350 17 G/17G
17 POWDER, FOR SOLUTION ORAL
Qty: 476 | Refills: 1
Start: 2022-10-12 | End: 2022-11-17

## 2022-10-12 RX ADMIN — POLYETHYLENE GLYCOL 3350 17 GRAM(S): 17 POWDER, FOR SOLUTION ORAL at 06:41

## 2022-10-12 RX ADMIN — POLYETHYLENE GLYCOL 3350 17 GRAM(S): 17 POWDER, FOR SOLUTION ORAL at 11:49

## 2022-10-12 RX ADMIN — POLYETHYLENE GLYCOL 3350 17 GRAM(S): 17 POWDER, FOR SOLUTION ORAL at 18:55

## 2022-10-12 NOTE — CONSULT NOTE ADULT - SUBJECTIVE AND OBJECTIVE BOX
Patient is a 66y old  Male who presents with a chief complaint of Large RP bleed (11 Oct 2022 20:15)    HPI:  HPI: 67 y/o M PMH follicular lymphoma dx in 2012 on chemo, BPH, mild intermittent asthma presents w increased urinary frequency and dysuria. Saturday afternoon he started going to the bathroom more than usual and noticed a burning sensation when urinating. He felt like he wasn't completely emptying his bladder and stopped drinking as much water, afraid that he would need to pee more. He hasn't been able to sleep during this period because of nocturia. He has had constipation over this time too with infrequent hard stools. He has had a UTI in the past but did not have this incomplete emptying. He denies any flank pain, fevers, chills, melena, hematochezia, abd pain, CP, SOB.    He follows w Dr. Harper for his follicular lymphoma which is grade III and last had chemo last Thursday 10/6 and Friday 10/7. Chemo regimen bendamustine and rituximab    In the ED:  Initial vital signs: T: 98.8 F, HR: 63, BP: 145/81, R: 18, SpO2: 95% on RA  ED course:   Labs: significant for wbc 61 (9.3 on 10/6, 44 on 8/29), hgb 11.4 (baseline 10.8-11.5), , AST 17, ALT 11, UA w no pyuria  Imaging:   CT A/P: showing new heterogeneity of RP LN w dense likely hemorrhagic foci. abdominopelvic fat infiltration and fascial thickening probably sequela of bleed. L diaphragmatic nodules probably blood products. Small R pleural effusion  Medications: CTX 1 g  Consults: none     Pt had schwarz placed in ED and a large BM.   (11 Oct 2022 19:14)      Vital Signs Last 24 Hrs  T(C): 37.1 (12 Oct 2022 09:12), Max: 37.1 (11 Oct 2022 12:30)  T(F): 98.7 (12 Oct 2022 09:12), Max: 98.7 (11 Oct 2022 12:30)  HR: 61 (12 Oct 2022 09:12) (57 - 70)  BP: 112/71 (12 Oct 2022 09:12) (112/71 - 159/78)  BP(mean): --  RR: 16 (12 Oct 2022 09:12) (16 - 18)  SpO2: 96% (12 Oct 2022 09:12) (95% - 97%)    Parameters below as of 12 Oct 2022 09:12  Patient On (Oxygen Delivery Method): room air      I&O's Summary    11 Oct 2022 07:01  -  12 Oct 2022 07:00  --------------------------------------------------------  IN: 0 mL / OUT: 4030 mL / NET: -4030 mL        PE:  Gen:  Abd:  :  MAYA:    LABS:                        11.4   29.60 )-----------( 314      ( 12 Oct 2022 05:30 )             36.3     10-12    145  |  106  |  14  ----------------------------<  83  4.0   |  27  |  1.07    Ca    9.3      12 Oct 2022 05:30    TPro  6.4  /  Alb  3.9  /  TBili  0.3  /  DBili  x   /  AST  17  /  ALT  11  /  AlkPhos  154<H>  10-11    PT/INR - ( 11 Oct 2022 22:58 )   PT: 16.3 sec;   INR: 1.37          PTT - ( 11 Oct 2022 22:58 )  PTT:34.9 sec  Cultures  Culture Results:   Less than 10,000 cols/cc; Insignificant amount of growth. (10-11 @ 12:21)      A/P Patient is a 66y old  Male who presents with a chief complaint of Large RP bleed (11 Oct 2022 20:15)    HPI:  HPI: 67 y/o M PMH follicular lymphoma dx in 2012 on chemo, BPH, mild intermittent asthma presents w increased urinary frequency and dysuria. Saturday afternoon he started going to the bathroom more than usual and noticed a burning sensation when urinating. He felt like he wasn't completely emptying his bladder and stopped drinking as much water, afraid that he would need to pee more. He hasn't been able to sleep during this period because of nocturia. He has had constipation over this time too with infrequent hard stools. He has had a UTI in the past but did not have this incomplete emptying. He denies any flank pain, fevers, chills, melena, hematochezia, abd pain, CP, SOB.    He follows w Dr. Harper for his follicular lymphoma which is grade III and last had chemo last Thursday 10/6 and Friday 10/7. Chemo regimen bendamustine and rituximab     Note: Above as discussed. Patient has a history of urinary retention and BPH with previous follow up with outpatient . He underwent a TURP procedure in 2017 and was started on Flomax at that time. His PCP has been refilling his Flomax and he has not followed up with his urologist in a long time. He reports that initially after the surgery he felt relief of urinary hesitancy however this has worsened recently. He reports to urinary urgency and hesitancy as of recent and feeling of incomplete bladder emptying. He reports he contacted his PCP but his regimen was not changed at that time. At this time he had his schwarz catheter removed by the primary team for a trial of void. 1000cc initial output during catheter placement.     In the ED:  Initial vital signs: T: 98.8 F, HR: 63, BP: 145/81, R: 18, SpO2: 95% on RA  ED course:   Labs: significant for wbc 61 (9.3 on 10/6, 44 on 8/29), hgb 11.4 (baseline 10.8-11.5), , AST 17, ALT 11, UA w no pyuria  Imaging:   CT A/P: showing new heterogeneity of RP LN w dense likely hemorrhagic foci. abdominopelvic fat infiltration and fascial thickening probably sequela of bleed. L diaphragmatic nodules probably blood products. Small R pleural effusion  Medications: CTX 1 g  Consults: none     Pt had schwarz placed in ED and a large BM.   (11 Oct 2022 19:14)      Vital Signs Last 24 Hrs  T(C): 37.1 (12 Oct 2022 09:12), Max: 37.1 (11 Oct 2022 12:30)  T(F): 98.7 (12 Oct 2022 09:12), Max: 98.7 (11 Oct 2022 12:30)  HR: 61 (12 Oct 2022 09:12) (57 - 70)  BP: 112/71 (12 Oct 2022 09:12) (112/71 - 159/78)  BP(mean): --  RR: 16 (12 Oct 2022 09:12) (16 - 18)  SpO2: 96% (12 Oct 2022 09:12) (95% - 97%)    Parameters below as of 12 Oct 2022 09:12  Patient On (Oxygen Delivery Method): room air      I&O's Summary    11 Oct 2022 07:01  -  12 Oct 2022 07:00  --------------------------------------------------------  IN: 0 mL / OUT: 4030 mL / NET: -4030 mL        PE:  Gen: NAD. AOx3, vitals stable  Abd: soft, nt, nd  : no suprapubic pain or tenderness to palpation, phallus WNL, no testicular pain to palpation    LABS:                        11.4   29.60 )-----------( 314      ( 12 Oct 2022 05:30 )             36.3     10-12    145  |  106  |  14  ----------------------------<  83  4.0   |  27  |  1.07    Ca    9.3      12 Oct 2022 05:30    TPro  6.4  /  Alb  3.9  /  TBili  0.3  /  DBili  x   /  AST  17  /  ALT  11  /  AlkPhos  154<H>  10-11    PT/INR - ( 11 Oct 2022 22:58 )   PT: 16.3 sec;   INR: 1.37          PTT - ( 11 Oct 2022 22:58 )  PTT:34.9 sec  Cultures  Culture Results:   Less than 10,000 cols/cc; Insignificant amount of growth. (10-11 @ 12:21)      A/P

## 2022-10-12 NOTE — CONSULT NOTE ADULT - ATTENDING COMMENTS
Patient seen and examined, imaging reviewed.  No back pain. Main complaints are urinary retention and constipation.  No flank ecchymoses, non-tender.      Imp: His retroperitoneal hemorrhage is small and not clinically significant.  Rec: Continue treating other conditions.   Avoid anticoagulants.
65 y/o M PMH follicular lymphoma dx in 2012 on chemo, BPH, mild intermittent asthma presents w increased urinary frequency and dysuria.    -follow up trial of void and PVR  -no emergent  intervention indicated at this time  -continue Flomax 0.4mg QD  -would start Finasteride 5mg daily  -Will consider further treatments in outpatient setting - will need to coordinate to prevent disruption of chemo treatments

## 2022-10-12 NOTE — DISCHARGE NOTE PROVIDER - NSDCMRMEDTOKEN_GEN_ALL_CORE_FT
levalbuterol 45 mcg/inh inhalation aerosol: 2 puff(s) inhaled every 4 hours, As Needed  tamsulosin 0.4 mg oral capsule: 1 cap(s) orally once a day   levalbuterol 45 mcg/inh inhalation aerosol: 2 puff(s) inhaled every 4 hours, As Needed  polyethylene glycol 3350 oral powder for reconstitution: 17 gram(s) orally 2 times a day  senna leaf extract oral tablet: 2 tab(s) orally once a day (at bedtime)  tamsulosin 0.4 mg oral capsule: 1 cap(s) orally once a day

## 2022-10-12 NOTE — DISCHARGE NOTE PROVIDER - ATTENDING DISCHARGE PHYSICAL EXAMINATION:
Patient was seen and examined at bedside on 10/12/2022 at 930 am. Patient complaining of improvement of abdominal symptoms. ROS is otherwise negative. Vitals, labwork and pertinent imaging reviewed. Physical exam - NAD, AAO x 4, PERRLA, EOMI, MMM, supple neck, chest - CTA b/l, CV - rrr, s1s2, no m/r/g, abd - soft, NTND, + BS, ext - wwp,, psych - normal affect, skin - no rash,  - + FC    Plan  -TOV as pt now ambulating and having BM  -C/w aggressive bowel regimen  -D/c to home today if PVR low

## 2022-10-12 NOTE — DISCHARGE NOTE PROVIDER - HOSPITAL COURSE
#Discharge: do not delete    Patient is 65 yo M with past medical history of follicular lymphoma dx in 2012 on chemo, BPH, mild intermittent asthma, and "borderline irregular heartbeat" (not on any medication for this) who presented with increased urinary frequency and dysuria, found to have hemorrhagic RP LN and admitted to hemoglobin monitoring.     Hospital course (by problem):     #Lymphadenopathy, abdominal.   CT showing new hemorrhagic foci, abdomniopelvic fat infiltration w fascial thickening possibly from hemorrhage.  Low suspicion of active bleed but unclear if findings are from previously bleed. No back pain, hemoglobin at baseline, HD stable. No trauma and not on AC. Dr. Harper saw in ED and recommended to monitor overnight and check hgb in the AM. Surgery consulted for possible intervention.  - f/u surgery recs  - monitor vitals  - check hgb in am  - active T+S  - f/u CT as outpatient.    #Follicular lymphoma.   ·  Plan: Diagnosed in 2012, receiving chemotherapy w Dr. Harper, bendamustine and rituximab last on 10/6 and 10/7. Likely cause of hemorrhagic LN and RP bleed.  - f/u heme/onc recs  - if pt ends up staying longer f/u recs w Dr. Harper.    #Leukocytosis.   ·  Plan: Likely from lymphoma. WBC 60. WBC on 10/6 befor recent chemo session 9.3, and on 8/29 was 44. Although pt had increased urinary frequency and dysuria, UA negative for pyuria. Sx likely due to acute on chronic retention (see below). No cough, SOB, diarrhea. Received 1g CTX in ED  - hold abx for now   - f/u urine cx.    #Acute on chronic urinary retention.   Likely due to constipation and BPH leading to increased frequency w incomplete emptying. Colby placed in ED. Now had large BM. Removed foleyon 10/12  - remove in AM w TOV  - c/w tamsulosin 0.4gm daily  - f/u urology outpatient.    #BPH (benign prostatic hyperplasia).   ·  Plan: see above.    # Chronic anemia.   Could be anemia of chronic disease in setting of lymphoma, follows w Dr. Harper. Currently 11.4 baseline between 10.8-11.5. HD stable.   - f/u w Dr. Harper.    #Mild intermittent asthma.   On levalbuterol at home prn, uses once a week. Not in acute exacerbation. Patient was given albuterol prn as inpatient.  - c/w     Patient was discharged to: home    New medications:   Changes to old medications:  Medications that were stopped:    Items to follow up as outpatient:    Physical exam at the time of discharge: #Discharge: do not delete    Patient is 65 yo M with past medical history of follicular lymphoma dx in 2012 on chemo, BPH, mild intermittent asthma, and "borderline irregular heartbeat" (not on any medication for this) who presented with increased urinary frequency and dysuria, found to have hemorrhagic RP LN and admitted to hemoglobin monitoring.     Hospital course (by problem):     #Lymphadenopathy, abdominal.   CT showing new hemorrhagic foci, abdomniopelvic fat infiltration w fascial thickening possibly from hemorrhage.  Low suspicion of active bleed but unclear if findings are from previously bleed. No back pain, hemoglobin at baseline, HD stable. No trauma and not on AC. Dr. Harper saw in ED and recommended to monitor overnight and check hgb in the AM. Surgery consulted for possible intervention, but determined there was no need as patient remained hemodynamically stable. AM hemoglobin remained stable at 11.4.  - f/u CT as outpatient.    #Follicular lymphoma.   Diagnosed in 2012, receiving chemotherapy w Dr. Harper, bendamustine and rituximab last on 10/6 and 10/7. Likely cause of hemorrhagic LN and RP bleed.  - f/u appointment with Dr. Harper    #Leukocytosis.   Likely from lymphoma. WBC 60 on admission. WBC on 10/6 befor recent chemo session 9.3, and on 8/29 was 44. Although pt had increased urinary frequency and dysuria, UA negative for pyuria. Sx likely due to acute on chronic retention. No cough, SOB, diarrhea. Received 1g CTX in ED.  - f/u with Dr. Harper    #Acute on chronic urinary retention.   Likely due to constipation and BPH leading to increased frequency w incomplete emptying. Schwarz placed in ED. Patient given miralax and had large BM. Removed schwarz on 10/12 and completed trial of void. Patient was seen by urology inpatient and recommended f/u with them outpatient to monitor BPH.  - c/w tamsulosin 0.4gm daily  - f/u urology outpatient.    #BPH (benign prostatic hyperplasia).   - see above.    # Chronic anemia.   Could be anemia of chronic disease in setting of lymphoma, follows w Dr. Harper. Currently 11.4 baseline between 10.8-11.5. HD stable.   - f/u w Dr. Harper.    #Mild intermittent asthma.   On levalbuterol at home prn, uses once a week. Not in acute exacerbation. Patient was given albuterol prn as inpatient.  - c/w home meds    Patient was discharged to: home    New medications: miralax, senna  Changes to old medications: none  Medications that were stopped: none    Items to follow up as outpatient: f/u with Dr. Harper, f/u with PCP for constipation, f/u with urology for BPH    Physical exam at the time of discharge:    GEN - sitting up in bed, no apparent distress  HEENT - MMM, no scleral icterus  RESP - CTA BL, no increased work of breathing, on RA  CARDIO - NS1S2, RRR. No murmurs  ABD - nondistended, soft, mild tenderness to palpation lower abdomen, normoactive BS  Ext - trace pretibial edema, WWP  Neuro - AOx3, moves all extremities spontaneously  Skin - clean, dry, intact. no rashes or lesions.     - no CVA tenderness

## 2022-10-12 NOTE — CONSULT NOTE ADULT - ASSESSMENT
67 y/o M PMH follicular lymphoma dx in 2012 on chemo, BPH, mild intermittent asthma presents w increased urinary frequency and dysuria. 65 y/o M PMH follicular lymphoma dx in 2012 on chemo, BPH, mild intermittent asthma presents w increased urinary frequency and dysuria.    -follow up trial of void and PVR  -no emergent  intervention indicated at this time  -patient would benefit from outpatient follow up with urology for further evaluation of recurrent retention and BPH  -if fails TOV would recommend discharge with schwarz catheter  -continue Flomax 0.4mg QD  -would start Finasteride 5mg daily  -Will consider further treatments in outpatient setting - will need to coordinate to prevent disruption of chemo treatments  -if PVR >300-350cc with urinary urgency would recommend schwarz catheterization  -chronic indwelling catheters must be changed q30 days if prolonged catheterization required  -follow up with Dr. Dean or Dr. Ramses Dean  [facility] at [place]  [address] [place] [address]  T: [phone]  F: [phone]  67 y/o M PMH follicular lymphoma dx in 2012 on chemo, BPH, mild intermittent asthma presents w increased urinary frequency and dysuria.    -follow up trial of void and PVR  -no emergent  intervention indicated at this time  -patient would benefit from outpatient follow up with urology for further evaluation of recurrent retention and BPH  -if fails TOV would recommend discharge with schwarz catheter  -continue Flomax 0.4mg QD  -would start Finasteride 5mg daily  -Will consider further treatments in outpatient setting - will need to coordinate to prevent disruption of chemo treatments  -if PVR >300-350cc with urinary urgency would recommend schwarz catheterization  -chronic indwelling catheters must be changed q30 days if prolonged catheterization required  -follow up with Dr. Dean or Dr. Ramses Dean  [facility] at [place]  [address] [place] [address]  T: [phone]  F: [phone]

## 2022-10-12 NOTE — PROGRESS NOTE ADULT - PROBLEM SELECTOR PLAN 4
Likely due to constipation and BPH leading to increased frequency w incomplete emptying. Schwarz placed in ED. Now had large BM.   - keep schwarz in overnight  - remove in AM w TOV  - c/w tamsulosin 0.4gm daily  - f/u urology outpatient

## 2022-10-12 NOTE — PROGRESS NOTE ADULT - PROBLEM SELECTOR PLAN 6
Could be anemia of chronic disease in setting of lymphoma, follows w Dr. Harper. Currently 11.4 baseline between 10.8-11.5. HD stable  - active T+S  - repeat hgb in AM  - f/u w Dr. Harper

## 2022-10-12 NOTE — DISCHARGE NOTE NURSING/CASE MANAGEMENT/SOCIAL WORK - PATIENT PORTAL LINK FT
You can access the FollowMyHealth Patient Portal offered by [facility] by registering at the following website: [email or web address]. By joining [employer]’s FollowMyHealth portal, you will also be able to view your health information using other applications (apps) compatible with our system.

## 2022-10-12 NOTE — DISCHARGE NOTE NURSING/CASE MANAGEMENT/SOCIAL WORK - NSDCPEFALRISK_GEN_ALL_CORE
For information on Fall & Injury Prevention, visit: [email or web address] OR  [email or web address] OR  https://www.cdc.gov/steadi/patient.html

## 2022-10-12 NOTE — PROGRESS NOTE ADULT - PROBLEM SELECTOR PLAN 2
Diagnosed in 2012, receiving chemotherapy w Dr. Harper, bendamustine and rituximab last on 10/6 and 10/7. Likely cause of hemorrhagic LN and RP bleed.  - f/u heme/onc recs  - if pt ends up staying longer f/u recs w Dr. Harper
? bleed...since pt asymptomatic and Hb stable OK to DC on NO iron therapy as if this is a bleeding it's internal bleed, and the Iron will get reabsorbed...and since pt is constipated I would also avoid PO Iron...    FU in my office in 1 week

## 2022-10-12 NOTE — CONSULT NOTE ADULT - PROBLEM SELECTOR RECOMMENDATION 9
-follow up trial of void  -no emergent  intervention indicated at this time  -patient would benefit from outpatient follow up with urology for further evaluation of recurrent retention and BPH  -if fails TOV would recommend discharge with schwarz catheter  -continue Flomax 0.4mg QD  -Will consider further treatments in outpatient setting  -if PVR >300-350cc with urinary urgency would recommend schwarz catheterization  -chronic indwelling catheters must be changed q30 days if prolonged catheterization required  -follow up with Dr. Dean -As discussed above

## 2022-10-12 NOTE — DISCHARGE NOTE PROVIDER - NSDCFUADDAPPT_GEN_ALL_CORE_FT
Please keep your appointment with Dr. Asencio, urologist on 10/17/2022. His office can be contacted at the following number: [phone].    Please follow up with Dr. Harper in clinic. Her office can be contacted at the following number: [phone].    Please follow up with your primary care physician to monitor for improvement in your constipation. Please call their office to schedule an appointment within 1-2 weeks from discharge.

## 2022-10-12 NOTE — PROGRESS NOTE ADULT - SUBJECTIVE AND OBJECTIVE BOX
SUBJECTIVE: Patient seen and examined bedside; no events overnight, HD stable, tolerating diet, no fever/nausea/vomit.    tamsulosin 0.4 milliGRAM(s) Oral at bedtime      Vital Signs Last 24 Hrs  T(C): 37.1 (12 Oct 2022 09:12), Max: 37.1 (12 Oct 2022 09:12)  T(F): 98.7 (12 Oct 2022 09:12), Max: 98.7 (12 Oct 2022 09:12)  HR: 61 (12 Oct 2022 09:12) (57 - 70)  BP: 112/71 (12 Oct 2022 09:12) (112/71 - 159/78)  BP(mean): --  RR: 16 (12 Oct 2022 09:12) (16 - 18)  SpO2: 96% (12 Oct 2022 09:12) (95% - 97%)    Parameters below as of 12 Oct 2022 09:12  Patient On (Oxygen Delivery Method): room air      I&O's Detail    11 Oct 2022 07:01  -  12 Oct 2022 07:00  --------------------------------------------------------  IN:  Total IN: 0 mL    OUT:    Indwelling Catheter - Urethral (mL): 4030 mL  Total OUT: 4030 mL    Total NET: -4030 mL      PE:    General: NAD, resting comfortably in bed  C/V: HR and BP wnl  Pulm: Nonlabored breathing, no respiratory distress  Abd: Obese, soft, NTND  Extrem: WW        LABS:                        11.4   29.60 )-----------( 314      ( 12 Oct 2022 05:30 )             36.3     10-12    145  |  106  |  14  ----------------------------<  83  4.0   |  27  |  1.07    Ca    9.3      12 Oct 2022 05:30    TPro  6.4  /  Alb  3.9  /  TBili  0.3  /  DBili  x   /  AST  17  /  ALT  11  /  AlkPhos  154<H>  10-11    PT/INR - ( 11 Oct 2022 22:58 )   PT: 16.3 sec;   INR: 1.37          PTT - ( 11 Oct 2022 22:58 )  PTT:34.9 sec  Urinalysis Basic - ( 11 Oct 2022 10:02 )    Color: Yellow / Appearance: Clear / S.010 / pH: x  Gluc: x / Ketone: NEGATIVE  / Bili: Negative / Urobili: 0.2 E.U./dL   Blood: x / Protein: NEGATIVE mg/dL / Nitrite: POSITIVE   Leuk Esterase: NEGATIVE / RBC: < 5 /HPF / WBC < 5 /HPF   Sq Epi: x / Non Sq Epi: 0-5 /HPF / Bacteria: None /HPF        RADIOLOGY & ADDITIONAL STUDIES:  
HEALTH ISSUES - PROBLEM Dx:  Abdominal lymphadenopathy    Follicular lymphoma    Leukocytosis    Acute on chronic urinary retention    BPH (benign prostatic hyperplasia)    Mild intermittent asthma    Prophylactic measure    Retroperitoneal bleed    Chronic anemia    Lymphadenopathy, abdominal    BPH (benign prostatic hyperplasia)            CHEMOTHERAPY REGIMEN:        Day:                          Diet:  Protocol:                                    IVF:      MEDICATIONS  (STANDING):  polyethylene glycol 3350 17 Gram(s) Oral two times a day  senna 2 Tablet(s) Oral at bedtime  tamsulosin 0.4 milliGRAM(s) Oral at bedtime    MEDICATIONS  (PRN):  ALBUTerol    90 MICROgram(s) HFA Inhaler 2 Puff(s) Inhalation every 6 hours PRN Shortness of Breath and/or Wheezing      Allergies    ibuprofen (Unknown)  naproxen (Short breath)  Shrimp (Unknown)    Intolerances        DVT Prophylaxis: [ ] YES [ ] NO      Antibiotics: [ ] YES [ ] NO    Pain Scale (1-10):       Location:    Vital Signs Last 24 Hrs  T(C): 36.6 (12 Oct 2022 16:35), Max: 37.1 (12 Oct 2022 09:12)  T(F): 97.9 (12 Oct 2022 16:35), Max: 98.7 (12 Oct 2022 09:12)  HR: 70 (12 Oct 2022 16:35) (57 - 70)  BP: 124/75 (12 Oct 2022 16:35) (112/71 - 159/78)  BP(mean): --  RR: 17 (12 Oct 2022 16:35) (16 - 18)  SpO2: 97% (12 Oct 2022 16:35) (95% - 97%)    Parameters below as of 12 Oct 2022 16:35  Patient On (Oxygen Delivery Method): room air        Drug Dosing Weight  Height (cm): 170.2 (11 Oct 2022 09:53)  Weight (kg): 74.4 (11 Oct 2022 09:53)  BMI (kg/m2): 25.7 (11 Oct 2022 09:53)  BSA (m2): 1.86 (11 Oct 2022 09:53)     Physical Exam:  · Constitutional	detailed exam  · Constitutional Details	well-developed; well-groomed  · Eyes	EOMI; PERRL; no drainage or redness  · ENMT Comments	dry mucous membranes  · Respiratory	detailed exam  · Respiratory Comments	normal breath sounds at the lung bases bilaterally  · Cardiovascular	Regular rate & rhythm, normal S1, S2; no murmurs, gallops or rubs; no S3, S4  · Abd-Soft non tender  ·Ext-no edema, clubbing or cyanosis    URINARY CATHETER: [ ] YES [ ] NO     LABS:  CBC Full  -  ( 12 Oct 2022 05:30 )  WBC Count : 29.60 K/uL  RBC Count : 4.11 M/uL  Hemoglobin : 11.4 g/dL  Hematocrit : 36.3 %  Platelet Count - Automated : 314 K/uL  Mean Cell Volume : 88.3 fl  Mean Cell Hemoglobin : 27.7 pg  Mean Cell Hemoglobin Concentration : 31.4 gm/dL  Auto Neutrophil # : x  Auto Lymphocyte # : x  Auto Monocyte # : x  Auto Eosinophil # : x  Auto Basophil # : x  Auto Neutrophil % : x  Auto Lymphocyte % : x  Auto Monocyte % : x  Auto Eosinophil % : x  Auto Basophil % : x    10-12    145  |  106  |  14  ----------------------------<  83  4.0   |  27  |  1.07    Ca    9.3      12 Oct 2022 05:30    TPro  6.4  /  Alb  3.9  /  TBili  0.3  /  DBili  x   /  AST  17  /  ALT  11  /  AlkPhos  154<H>  10-11    PT/INR - ( 11 Oct 2022 22:58 )   PT: 16.3 sec;   INR: 1.37          PTT - ( 11 Oct 2022 22:58 )  PTT:34.9 sec  Urinalysis Basic - ( 11 Oct 2022 10:02 )    Color: Yellow / Appearance: Clear / S.010 / pH: x  Gluc: x / Ketone: NEGATIVE  / Bili: Negative / Urobili: 0.2 E.U./dL   Blood: x / Protein: NEGATIVE mg/dL / Nitrite: POSITIVE   Leuk Esterase: NEGATIVE / RBC: < 5 /HPF / WBC < 5 /HPF   Sq Epi: x / Non Sq Epi: 0-5 /HPF / Bacteria: None /HPF        CULTURES:    RADIOLOGY & ADDITIONAL STUDIES:
PARAM PONCE  66y, Male    Patient is a 66y old  Male who presents with a chief complaint of Large RP bleed (11 Oct 2022 20:15)      INTERVAL HPI/OVERNIGHT EVENTS:    ROS: otherwise negative      T(C): 36.5 (10-12-22 @ 05:13), Max: 37.1 (10-11-22 @ 09:53)  HR: 58 (10-12-22 @ 05:13) (57 - 70)  BP: 129/57 (10-12-22 @ 05:13) (129/57 - 159/78)  RR: 18 (10-12-22 @ 05:13) (17 - 18)  SpO2: 95% (10-12-22 @ 05:13) (95% - 97%)  Wt(kg): --Vital Signs Last 24 Hrs  T(C): 36.5 (12 Oct 2022 05:13), Max: 37.1 (11 Oct 2022 09:53)  T(F): 97.7 (12 Oct 2022 05:13), Max: 98.8 (11 Oct 2022 09:53)  HR: 58 (12 Oct 2022 05:13) (57 - 70)  BP: 129/57 (12 Oct 2022 05:13) (129/57 - 159/78)  BP(mean): --  RR: 18 (12 Oct 2022 05:13) (17 - 18)  SpO2: 95% (12 Oct 2022 05:13) (95% - 97%)    Parameters below as of 12 Oct 2022 05:13  Patient On (Oxygen Delivery Method): room air        PHYSICAL EXAM:  Constitutional: resting comfortably in bed; NAD  Head: NC/AT  Eyes: PERRL, EOMI, anicteric sclera  ENT: no nasal discharge; uvula midline, no oropharyngeal erythema or exudates; MMM  Neck: supple; no JVD or thyromegaly  Respiratory: CTA B/L; no W/R/R, no retractions  Cardiac: +S1/S2; RRR; no M/R/G; PMI non-displaced  Gastrointestinal: soft, NT/ND; no rebound or guarding; +BSx4  Genitourinary: normal external genitalia  Back: spine midline, no bony tenderness or step-offs; no CVAT B/L  Extremities: WWP, no clubbing or cyanosis; no peripheral edema. Capillary refill <2 sec  Musculoskeletal: NROM x4; no joint swelling, tenderness or erythema  Vascular: 2+ radial, femoral, DP/PT pulses B/L  Dermatologic: skin warm, dry and intact; no rashes, wounds, or scars  Lymphatic: no submandibular or cervical LAD  Neurologic: AAOx3; CNII-XII grossly intact; no focal deficits  Psychiatric: affect and characteristics of appearance, verbalizations, behaviors are appropriate    LABS:                        11.4   29.60 )-----------( 314      ( 12 Oct 2022 05:30 )             36.3     10-11    145  |  106  |  13  ----------------------------<  101<H>  4.4   |  28  |  0.99    Ca    9.7      11 Oct 2022 11:37    TPro  6.4  /  Alb  3.9  /  TBili  0.3  /  DBili  x   /  AST  17  /  ALT  11  /  AlkPhos  154<H>  10-11      PT/INR - ( 11 Oct 2022 22:58 )   PT: 16.3 sec;   INR: 1.37          PTT - ( 11 Oct 2022 22:58 )  PTT:34.9 sec  Urinalysis Basic - ( 11 Oct 2022 10:02 )    Color: Yellow / Appearance: Clear / S.010 / pH: x  Gluc: x / Ketone: NEGATIVE  / Bili: Negative / Urobili: 0.2 E.U./dL   Blood: x / Protein: NEGATIVE mg/dL / Nitrite: POSITIVE   Leuk Esterase: NEGATIVE / RBC: < 5 /HPF / WBC < 5 /HPF   Sq Epi: x / Non Sq Epi: 0-5 /HPF / Bacteria: None /HPF      CAPILLARY BLOOD GLUCOSE      POCT Blood Glucose.: 100 mg/dL (11 Oct 2022 09:54)        Urinalysis Basic - ( 11 Oct 2022 10:02 )    Color: Yellow / Appearance: Clear / S.010 / pH: x  Gluc: x / Ketone: NEGATIVE  / Bili: Negative / Urobili: 0.2 E.U./dL   Blood: x / Protein: NEGATIVE mg/dL / Nitrite: POSITIVE   Leuk Esterase: NEGATIVE / RBC: < 5 /HPF / WBC < 5 /HPF   Sq Epi: x / Non Sq Epi: 0-5 /HPF / Bacteria: None /HPF        MEDICATIONS  (STANDING):  tamsulosin 0.4 milliGRAM(s) Oral at bedtime    MEDICATIONS  (PRN):  ALBUTerol    90 MICROgram(s) HFA Inhaler 2 Puff(s) Inhalation every 6 hours PRN Shortness of Breath and/or Wheezing      RADIOLOGY & ADDITIONAL TESTS:    Imaging Personally Reviewed:  [x] YES  [ ] NO

## 2022-10-12 NOTE — PROGRESS NOTE ADULT - PROBLEM SELECTOR PLAN 3
Likely from lymphoma. WBC 60. WBC on 10/6 befor recent chemo session 9.3, and on 8/29 was 44. Although pt had increased urinary frequency and dysuria, UA negative for pyuria. Sx likely due to acute on chronic retention (see below). No cough, SOB, diarrhea. Received 1g CTX in ED  - hold abx for now   - f/u urine cx

## 2022-10-12 NOTE — PATIENT PROFILE ADULT - NSPROGENSOURCEINFO_GEN_A_NUR
Chief Complaint   Patient presents with     Rib Pain     recheck rib pain, pain is getting worse on right side, pain while brushing his teeth or sleeping,  has layed low on working out     Pre-visit Screening:  Immunizations:  up to date  Colonoscopy:  is up to date  Mammogram: NA  Asthma Action Test/Plan:  NA  PHQ9:  NA  GAD7:  NA  Questioned patient about current smoking habits Pt. has never smoked.  Ok to leave detailed message on voice mail for today's visit only Yes, phone # [phone]       patient

## 2022-10-12 NOTE — PROGRESS NOTE ADULT - ASSESSMENT
65 y/o M PMH follicular lymphoma dx in 2012 on chemo, BPH, mild intermittent asthma presents w increased urinary frequency and dysuria. Saturday afternoon he started going to the bathroom more than usual and noticed a burning sensation when urinating as well as some constipation. In ED afebrile and hemodynamically stable, WBC 61K, Hb 11.4 (11.5 on 10/4). Surgery consulted for CT scan findings of new heterogeneity of retroperitoneal lymph nodes with dense, possibly hemorrhagic foci and new abdominopelvic fat infiltration and fascial thickening, concerning for retroperitoneal hemorrhagic lymph nodes. Patient HD stable, H/H stable, tolerating diet, no obvious sign of active bleeding 10/12.    Plan:    - No acute surgical intervention at this time - low suspicion for active bleeding  - Rest of care per primary team  - Surgery team 4c will sign off. Reconsult as needed.  
67 y/o M PMH follicular lymphoma dx in 2012 on chemo, BPH, mild intermittent asthma presents w increased urinary frequency and dysuria, found to have hemorrhagic RP LN and admitted fo hgb monitoring.
Hb remains stable...    CT scan reviewed , large Liver cyst, and disintegration of retroperitoneal LN...Pt clinically asymptimatic...admits to recent constipation and straining in the bathroom

## 2022-10-12 NOTE — PROGRESS NOTE ADULT - PROBLEM SELECTOR PLAN 7
On levalbuterol at home prn, uses once a week. Not in acute exacerbation.   - albuterol prn as inpatient

## 2022-10-12 NOTE — PROGRESS NOTE ADULT - PROBLEM SELECTOR PLAN 1
CT showing new hemorrhagic foci, abdomniopelvic fat infiltration w fascial thickening possibly from hemorrhage.  Low suspicion of active bleed but unclear if findings are from previously bleed. No back pain, hemoglobin at baseline, HD stable. No trauma and not on AC. Dr. Harper saw in ED and recommended to monitor overnight and check hgb in the AM. Surgery consulted for possible intervention.  - f/u surgery recs  - monitor vitals  - check hgb in am  - active T+S  - f/u CT as outpatient
s/p BR chemotherapy 1 week ago

## 2022-10-12 NOTE — DISCHARGE NOTE PROVIDER - NSDCCPCAREPLAN_GEN_ALL_CORE_FT
PRINCIPAL DISCHARGE DIAGNOSIS  Diagnosis: Urinary retention  Assessment and Plan of Treatment: Benign prostatic hyperplasia, also called "BPH," is a common problem. Many men with BPH have no symptoms at all. When symptoms do occur, they can include needing to urinate often, especially at night, having trouble starting to urinate (this means that you might have to wait or strain before urine will come out), having a weak urine stream, leaking or dribbling urine, feeling as though your bladder is not empty even after you urinate. In rare cases, BPH makes it so a man cannot urinate at all. This is a serious problem. If you cannot urinate at all or are not able to fully empty your bladder, call your doctor right away or go to the emergency room. Please see the urologist outpatient in order to follow up on this issue and continue taking your medications as prescribed.        SECONDARY DISCHARGE DIAGNOSES  Diagnosis: Constipation  Assessment and Plan of Treatment: Constipation is a common problem that makes it hard to have bowel movements. Your bowel movements might be too hard, too small, hard to get out, or happening fewer than 3 times a week. Constipation can be caused by: side effects of some medicines, poor diet, or diseases of the digestive system. In order to prevent constipation, eat foods that have a lot of fiber. Good choices are fruits, vegetables, prune juice, and cereal. Drink plenty of water and other fluids. There are also medications you can take to help make it easier to have bowel movements. Please take these medications as prescribed.     PRINCIPAL DISCHARGE DIAGNOSIS  Diagnosis: Urinary retention  Assessment and Plan of Treatment: Benign prostatic hyperplasia, also called "BPH," is a common problem. Many men with BPH have no symptoms at all. When symptoms do occur, they can include needing to urinate often, especially at night, having trouble starting to urinate (this means that you might have to wait or strain before urine will come out), having a weak urine stream, leaking or dribbling urine, feeling as though your bladder is not empty even after you urinate. In rare cases, BPH makes it so a man cannot urinate at all. This is a serious problem. If you cannot urinate at all or are not able to fully empty your bladder, call your doctor right away or go to the emergency room. Please see the urologist outpatient in order to follow up on this issue and continue taking your medications as prescribed.  Changing from one bag to  another  1. Gather your equipment:  ¦ clean leg bag or Colby bag  ¦ alcohol prep  2. Wash your hands thoroughly with soap  and water.  3. Swab the end of the drainage tubing  that will be attached to the Colby  catheter.  4. Disconnect the drain-age bag from the  Colby catheter and put the bag aside.  5. Attach a clean bag to the catheter.  6. Wash your hands thoroughly with soap  and water or hand .  Cleaning the bags  1. Empty the urine from the bag. Leave  the spout end open for cleaning.  2. Gather and prepare cleaning supplies:  ¦	 mixture of 1 cup vinegar and 1 cup  cool water  ¦	 50 mL syringe  ¦ cap or cover for tube connector  Or  Commercially prepared urinary appliance    3. Flush the bag with tap water using  syringe. Drain water from the drainage  spout and close the spout.   4. Using a syringe, fill the bag half full  with vinegar solution or commercial  cleanser. Close the bag and allow this  liquid to stay in the bag for 30 minutes  (or as instructed on commercial  cleanser).  5. Drain the cleaning solution, rinse the  entire bag with tap water, and drain  again.  6. Use the syringe to put air in the bag.  Hang the bag to dry with all caps and  the drainage spout open.  7. When every      SECONDARY DISCHARGE DIAGNOSES  Diagnosis: Constipation  Assessment and Plan of Treatment: Constipation is a common problem that makes it hard to have bowel movements. Your bowel movements might be too hard, too small, hard to get out, or happening fewer than 3 times a week. Constipation can be caused by: side effects of some medicines, poor diet, or diseases of the digestive system. In order to prevent constipation, eat foods that have a lot of fiber. Good choices are fruits, vegetables, prune juice, and cereal. Drink plenty of water and other fluids. There are also medications you can take to help make it easier to have bowel movements. Please take these medications as prescribed.

## 2022-10-12 NOTE — DISCHARGE NOTE PROVIDER - NSDCFUSCHEDAPPT_GEN_ALL_CORE_FT
Wil Asencio  [facility]  UROLOGY [address]  Scheduled Appointment: 10/17/2022    [facility] PreAdmits  Scheduled Appointment: 10/27/2022    [facility]  AMBCHEMO  [address]  Scheduled Appointment: 10/27/2022    [facility] PreAdmits  Scheduled Appointment: 10/28/2022    [facility]  AMBCHEMO  [address]  Scheduled Appointment: 10/28/2022

## 2022-10-12 NOTE — DISCHARGE NOTE PROVIDER - CARE PROVIDER_API CALL
Mckenzie Harper)  Internal Medicine  [address]  [place], [place] [address]  Phone: [phone]  Fax: [phone]  Established Patient  Follow Up Time:

## 2022-10-12 NOTE — DISCHARGE NOTE NURSING/CASE MANAGEMENT/SOCIAL WORK - NSDCFUADDAPPT_GEN_ALL_CORE_FT
Please keep your appointment with Dr. Asencio, urologist on 10/17/2022. His office can be contacted at the following number: [phone].    Please follow up with Dr. Harper in clinic. Her office can be contacted at the following number: [phone].    Please follow up with your primary care physician to monitor for improvement in your constipation. Please call their office to schedule an appointment within 1-2 weeks from discharge.

## 2022-10-13 DIAGNOSIS — N40.0 BENIGN PROSTATIC HYPERPLASIA WITHOUT LOWER URINARY TRACT SYMPMS: ICD-10-CM

## 2022-10-13 RX ORDER — FINASTERIDE 5 MG/1
5 TABLET, FILM COATED ORAL DAILY
Qty: 90 | Refills: 3 | Status: ACTIVE | COMMUNITY
Start: 2022-10-13 | End: 1900-01-01

## 2022-10-13 RX ORDER — FINASTERIDE 5 MG/1
1 TABLET, FILM COATED ORAL
Qty: 90 | Refills: 3
Start: 2022-10-13 | End: 2023-10-07

## 2022-10-17 ENCOUNTER — APPOINTMENT (OUTPATIENT)
Dept: UROLOGY | Facility: CLINIC | Age: 67
End: 2022-10-17

## 2022-10-17 VITALS
TEMPERATURE: 96.9 F | SYSTOLIC BLOOD PRESSURE: 106 MMHG | DIASTOLIC BLOOD PRESSURE: 75 MMHG | HEART RATE: 123 BPM | WEIGHT: 165 LBS | BODY MASS INDEX: 26.52 KG/M2 | HEIGHT: 66 IN

## 2022-10-17 DIAGNOSIS — Z91.013 ALLERGY TO SEAFOOD: ICD-10-CM

## 2022-10-17 DIAGNOSIS — C82.90 FOLLICULAR LYMPHOMA, UNSPECIFIED, UNSPECIFIED SITE: ICD-10-CM

## 2022-10-17 DIAGNOSIS — J45.20 MILD INTERMITTENT ASTHMA, UNCOMPLICATED: ICD-10-CM

## 2022-10-17 DIAGNOSIS — R58 HEMORRHAGE, NOT ELSEWHERE CLASSIFIED: ICD-10-CM

## 2022-10-17 DIAGNOSIS — R35.1 NOCTURIA: ICD-10-CM

## 2022-10-17 DIAGNOSIS — R33.8 BENIGN PROSTATIC HYPERPLASIA WITH LOWER URINARY TRACT SYMPMS: ICD-10-CM

## 2022-10-17 DIAGNOSIS — D63.0 ANEMIA IN NEOPLASTIC DISEASE: ICD-10-CM

## 2022-10-17 DIAGNOSIS — R35.0 FREQUENCY OF MICTURITION: ICD-10-CM

## 2022-10-17 DIAGNOSIS — Z79.52 LONG TERM (CURRENT) USE OF SYSTEMIC STEROIDS: ICD-10-CM

## 2022-10-17 DIAGNOSIS — C82.23: ICD-10-CM

## 2022-10-17 DIAGNOSIS — N40.1 BENIGN PROSTATIC HYPERPLASIA WITH LOWER URINARY TRACT SYMPTOMS: ICD-10-CM

## 2022-10-17 DIAGNOSIS — R33.8 OTHER RETENTION OF URINE: ICD-10-CM

## 2022-10-17 DIAGNOSIS — R39.14 FEELING OF INCOMPLETE BLADDER EMPTYING: ICD-10-CM

## 2022-10-17 DIAGNOSIS — N40.1 BENIGN PROSTATIC HYPERPLASIA WITH LOWER URINARY TRACT SYMPMS: ICD-10-CM

## 2022-10-17 DIAGNOSIS — K59.09 OTHER CONSTIPATION: ICD-10-CM

## 2022-10-17 DIAGNOSIS — K59.00 CONSTIPATION, UNSPECIFIED: ICD-10-CM

## 2022-10-17 DIAGNOSIS — R73.03 PREDIABETES: ICD-10-CM

## 2022-10-17 DIAGNOSIS — Z92.3 PERSONAL HISTORY OF IRRADIATION: ICD-10-CM

## 2022-10-17 DIAGNOSIS — Z79.899 OTHER LONG TERM (CURRENT) DRUG THERAPY: ICD-10-CM

## 2022-10-17 DIAGNOSIS — Z88.6 ALLERGY STATUS TO ANALGESIC AGENT: ICD-10-CM

## 2022-10-17 DIAGNOSIS — R33.9 RETENTION OF URINE, UNSPECIFIED: ICD-10-CM

## 2022-10-17 PROCEDURE — 51798 US URINE CAPACITY MEASURE: CPT

## 2022-10-17 PROCEDURE — 99214 OFFICE O/P EST MOD 30 MIN: CPT | Mod: 25

## 2022-10-18 PROBLEM — C82.23: Status: ACTIVE | Noted: 2020-10-14

## 2022-10-18 PROBLEM — K59.09 CONSTIPATION, CHRONIC: Status: ACTIVE | Noted: 2022-10-18

## 2022-10-18 NOTE — HISTORY OF PRESENT ILLNESS
[Urinary Retention] : urinary retention [Urinary Frequency] : urinary frequency [FreeTextEntry1] : Mr. VIRGEN is a 66 year old M with PMHx of BPH s/p TURP 2017, on tamsulosin; follicular lymphoma on chemo, mild intermittent asthma, who presented to ED one week ago with urinary frequency, dysuria. He was found to have urinary retention and hemorrhagic RP lymph nodes, hemodynamic stable. Colby catheter was placed in ED and 1000cc was drained from the bladder. He was admitted overnight for hemoglobin and observation. He did remain hemodynamically, passed TOV the following day and was discharged with finasteride, plan to continue tamsulosin. Additionally, was started on Miralax for constipation which was thought to contribute to retention. \par Today, he is feeling well. Urination has improved, thought he is not completely satisfied. His main complaints today are frequency, nocturia x4 and dribbling. He wears depends for leakage. Denies dysuria, hematuria. \par He did have TURP 2017 and has been on tamsulosin since with good effect until this episode of retention. \par  \par Social history; former smoker, denies ETOH\par Family history: Denies FH of  malignances\par Last PSA: unknown \par

## 2022-10-18 NOTE — END OF VISIT
[FreeTextEntry3] : I, Dr. Asencio, personally performed the evaluation and management (E/M) services for this new patient who presents today with (a) new problem(s)/exacerbation of (an) existing condition(s).  That E/M includes conducting the examination, assessing all new/exacerbated conditions, and establishing a new plan of care.  Today, our ACP, Jordyn Knight, was here to observe the evaluation and management services for this new problem/exacerbated condition to be followed going forward.\par

## 2022-10-18 NOTE — ASSESSMENT
[FreeTextEntry1] : Mr. VIRGEN is a 66 year old M with PMHx of BPH, urinary retention on tamsulosin, follicular lymphoma dx in 2012 on chemo, asthma, here to establish care s/p acute urinary retention one week ago. \par Hx of TURP 2017,has been on tamsulosin 0.4 mg. \par \par Passed POV prior to hospital discharge, was started on finasteride, and has been doing well, though still bothered by urinary symptoms.\par PVR 168cc today.\par Discussed increasing tamsulosin to 0.8 for faster symptoms relief as finasteride will not work immediately. \par Continue constipation management. \par RTC precautions reviewed. \par RTC 2 months. \par

## 2022-10-18 NOTE — PHYSICAL EXAM
[General Appearance - Well Developed] : well developed [General Appearance - Well Nourished] : well nourished [Normal Appearance] : normal appearance [Well Groomed] : well groomed [General Appearance - In No Acute Distress] : no acute distress [] : no respiratory distress [Respiration, Rhythm And Depth] : normal respiratory rhythm and effort [Exaggerated Use Of Accessory Muscles For Inspiration] : no accessory muscle use [Abdomen Soft] : soft [Abdomen Tenderness] : non-tender [Urethral Meatus] : meatus normal [Urinary Bladder Findings] : the bladder was normal on palpation [Scrotum] : the scrotum was normal [Testes Mass (___cm)] : there were no testicular masses [Normal Station and Gait] : the gait and station were normal for the patient's age [No Focal Deficits] : no focal deficits [Oriented To Time, Place, And Person] : oriented to person, place, and time [Affect] : the affect was normal [Mood] : the mood was normal [Not Anxious] : not anxious [FreeTextEntry1] :

## 2022-10-18 NOTE — REVIEW OF SYSTEMS
[Feeling Tired] : feeling tired [Constipation] : constipation [see HPI] : see HPI [Negative] : Heme/Lymph [Fever] : no fever [Chills] : no chills

## 2022-10-20 PROCEDURE — 86901 BLOOD TYPING SEROLOGIC RH(D): CPT

## 2022-10-20 PROCEDURE — 85025 COMPLETE CBC W/AUTO DIFF WBC: CPT

## 2022-10-20 PROCEDURE — 71260 CT THORAX DX C+: CPT | Mod: MA

## 2022-10-20 PROCEDURE — 85027 COMPLETE CBC AUTOMATED: CPT

## 2022-10-20 PROCEDURE — 82962 GLUCOSE BLOOD TEST: CPT

## 2022-10-20 PROCEDURE — 86803 HEPATITIS C AB TEST: CPT

## 2022-10-20 PROCEDURE — 87086 URINE CULTURE/COLONY COUNT: CPT

## 2022-10-20 PROCEDURE — 85610 PROTHROMBIN TIME: CPT

## 2022-10-20 PROCEDURE — 51798 US URINE CAPACITY MEASURE: CPT

## 2022-10-20 PROCEDURE — 80053 COMPREHEN METABOLIC PANEL: CPT

## 2022-10-20 PROCEDURE — 85730 THROMBOPLASTIN TIME PARTIAL: CPT

## 2022-10-20 PROCEDURE — 86900 BLOOD TYPING SEROLOGIC ABO: CPT

## 2022-10-20 PROCEDURE — 96374 THER/PROPH/DIAG INJ IV PUSH: CPT

## 2022-10-20 PROCEDURE — 74177 CT ABD & PELVIS W/CONTRAST: CPT | Mod: MA

## 2022-10-20 PROCEDURE — 80048 BASIC METABOLIC PNL TOTAL CA: CPT

## 2022-10-20 PROCEDURE — 99285 EMERGENCY DEPT VISIT HI MDM: CPT | Mod: 25

## 2022-10-20 PROCEDURE — 87635 SARS-COV-2 COVID-19 AMP PRB: CPT

## 2022-10-20 PROCEDURE — 86850 RBC ANTIBODY SCREEN: CPT

## 2022-10-20 PROCEDURE — 36415 COLL VENOUS BLD VENIPUNCTURE: CPT

## 2022-10-20 PROCEDURE — 81001 URINALYSIS AUTO W/SCOPE: CPT

## 2022-10-21 ENCOUNTER — EMERGENCY (EMERGENCY)
Facility: HOSPITAL | Age: 67
LOS: 1 days | Discharge: HOME | End: 2022-10-21
Attending: STUDENT IN AN ORGANIZED HEALTH CARE EDUCATION/TRAINING PROGRAM | Admitting: STUDENT IN AN ORGANIZED HEALTH CARE EDUCATION/TRAINING PROGRAM
Payer: MEDICARE

## 2022-10-21 VITALS
SYSTOLIC BLOOD PRESSURE: 131 MMHG | RESPIRATION RATE: 18 BRPM | DIASTOLIC BLOOD PRESSURE: 72 MMHG | TEMPERATURE: 98 F | OXYGEN SATURATION: 98 % | HEART RATE: 80 BPM

## 2022-10-21 VITALS
DIASTOLIC BLOOD PRESSURE: 84 MMHG | TEMPERATURE: 97 F | HEART RATE: 103 BPM | HEIGHT: 67 IN | WEIGHT: 164.91 LBS | SYSTOLIC BLOOD PRESSURE: 142 MMHG | RESPIRATION RATE: 18 BRPM | OXYGEN SATURATION: 97 %

## 2022-10-21 DIAGNOSIS — C85.90 NON-HODGKIN LYMPHOMA, UNSPECIFIED, UNSPECIFIED SITE: ICD-10-CM

## 2022-10-21 DIAGNOSIS — F32.9 MAJOR DEPRESSIVE DISORDER, SINGLE EPISODE, UNSPECIFIED: ICD-10-CM

## 2022-10-21 DIAGNOSIS — R33.9 RETENTION OF URINE, UNSPECIFIED: ICD-10-CM

## 2022-10-21 DIAGNOSIS — K59.00 CONSTIPATION, UNSPECIFIED: ICD-10-CM

## 2022-10-21 DIAGNOSIS — Z88.6 ALLERGY STATUS TO ANALGESIC AGENT: ICD-10-CM

## 2022-10-21 DIAGNOSIS — Z98.89 OTHER SPECIFIED POSTPROCEDURAL STATES: Chronic | ICD-10-CM

## 2022-10-21 DIAGNOSIS — Z88.1 ALLERGY STATUS TO OTHER ANTIBIOTIC AGENTS STATUS: ICD-10-CM

## 2022-10-21 DIAGNOSIS — Z98.890 OTHER SPECIFIED POSTPROCEDURAL STATES: ICD-10-CM

## 2022-10-21 DIAGNOSIS — R73.03 PREDIABETES: ICD-10-CM

## 2022-10-21 DIAGNOSIS — J45.31 MILD PERSISTENT ASTHMA WITH (ACUTE) EXACERBATION: ICD-10-CM

## 2022-10-21 DIAGNOSIS — R33.8 OTHER RETENTION OF URINE: ICD-10-CM

## 2022-10-21 DIAGNOSIS — Z91.013 ALLERGY TO SEAFOOD: ICD-10-CM

## 2022-10-21 DIAGNOSIS — N40.0 BENIGN PROSTATIC HYPERPLASIA WITHOUT LOWER URINARY TRACT SYMPTOMS: ICD-10-CM

## 2022-10-21 LAB
ANION GAP SERPL CALC-SCNC: 15 MMOL/L — SIGNIFICANT CHANGE UP (ref 5–17)
APPEARANCE UR: CLEAR — SIGNIFICANT CHANGE UP
BASOPHILS # BLD AUTO: 0.04 K/UL — SIGNIFICANT CHANGE UP (ref 0–0.2)
BASOPHILS NFR BLD AUTO: 0.6 % — SIGNIFICANT CHANGE UP (ref 0–2)
BILIRUB UR-MCNC: NEGATIVE — SIGNIFICANT CHANGE UP
BUN SERPL-MCNC: 18 MG/DL — SIGNIFICANT CHANGE UP (ref 7–23)
CALCIUM SERPL-MCNC: 9.8 MG/DL — SIGNIFICANT CHANGE UP (ref 8.4–10.5)
CHLORIDE SERPL-SCNC: 105 MMOL/L — SIGNIFICANT CHANGE UP (ref 96–108)
CO2 SERPL-SCNC: 26 MMOL/L — SIGNIFICANT CHANGE UP (ref 22–31)
COLOR SPEC: YELLOW — SIGNIFICANT CHANGE UP
CREAT SERPL-MCNC: 1.27 MG/DL — SIGNIFICANT CHANGE UP (ref 0.5–1.3)
DIFF PNL FLD: NEGATIVE — SIGNIFICANT CHANGE UP
EGFR: 62 ML/MIN/1.73M2 — SIGNIFICANT CHANGE UP
EOSINOPHIL # BLD AUTO: 0.2 K/UL — SIGNIFICANT CHANGE UP (ref 0–0.5)
EOSINOPHIL NFR BLD AUTO: 2.8 % — SIGNIFICANT CHANGE UP (ref 0–6)
GLUCOSE SERPL-MCNC: 81 MG/DL — SIGNIFICANT CHANGE UP (ref 70–99)
GLUCOSE UR QL: NEGATIVE — SIGNIFICANT CHANGE UP
HCT VFR BLD CALC: 36.7 % — LOW (ref 39–50)
HGB BLD-MCNC: 11.6 G/DL — LOW (ref 13–17)
IMM GRANULOCYTES NFR BLD AUTO: 0.6 % — SIGNIFICANT CHANGE UP (ref 0–0.9)
KETONES UR-MCNC: NEGATIVE — SIGNIFICANT CHANGE UP
LEUKOCYTE ESTERASE UR-ACNC: NEGATIVE — SIGNIFICANT CHANGE UP
LYMPHOCYTES # BLD AUTO: 0.83 K/UL — LOW (ref 1–3.3)
LYMPHOCYTES # BLD AUTO: 11.5 % — LOW (ref 13–44)
MCHC RBC-ENTMCNC: 28.2 PG — SIGNIFICANT CHANGE UP (ref 27–34)
MCHC RBC-ENTMCNC: 31.6 GM/DL — LOW (ref 32–36)
MCV RBC AUTO: 89.3 FL — SIGNIFICANT CHANGE UP (ref 80–100)
MONOCYTES # BLD AUTO: 0.88 K/UL — SIGNIFICANT CHANGE UP (ref 0–0.9)
MONOCYTES NFR BLD AUTO: 12.2 % — SIGNIFICANT CHANGE UP (ref 2–14)
NEUTROPHILS # BLD AUTO: 5.25 K/UL — SIGNIFICANT CHANGE UP (ref 1.8–7.4)
NEUTROPHILS NFR BLD AUTO: 72.3 % — SIGNIFICANT CHANGE UP (ref 43–77)
NITRITE UR-MCNC: NEGATIVE — SIGNIFICANT CHANGE UP
NRBC # BLD: 0 /100 WBCS — SIGNIFICANT CHANGE UP (ref 0–0)
PH UR: 6 — SIGNIFICANT CHANGE UP (ref 5–8)
PLATELET # BLD AUTO: 190 K/UL — SIGNIFICANT CHANGE UP (ref 150–400)
POTASSIUM SERPL-MCNC: 4.3 MMOL/L — SIGNIFICANT CHANGE UP (ref 3.5–5.3)
POTASSIUM SERPL-SCNC: 4.3 MMOL/L — SIGNIFICANT CHANGE UP (ref 3.5–5.3)
PROT UR-MCNC: NEGATIVE MG/DL — SIGNIFICANT CHANGE UP
RBC # BLD: 4.11 M/UL — LOW (ref 4.2–5.8)
RBC # FLD: 19.8 % — HIGH (ref 10.3–14.5)
SODIUM SERPL-SCNC: 146 MMOL/L — HIGH (ref 135–145)
SP GR SPEC: 1.01 — SIGNIFICANT CHANGE UP (ref 1–1.03)
UROBILINOGEN FLD QL: 0.2 E.U./DL — SIGNIFICANT CHANGE UP
WBC # BLD: 7.24 K/UL — SIGNIFICANT CHANGE UP (ref 3.8–10.5)
WBC # FLD AUTO: 7.24 K/UL — SIGNIFICANT CHANGE UP (ref 3.8–10.5)

## 2022-10-21 PROCEDURE — 51702 INSERT TEMP BLADDER CATH: CPT

## 2022-10-21 PROCEDURE — 36415 COLL VENOUS BLD VENIPUNCTURE: CPT

## 2022-10-21 PROCEDURE — 80048 BASIC METABOLIC PNL TOTAL CA: CPT

## 2022-10-21 PROCEDURE — 81003 URINALYSIS AUTO W/O SCOPE: CPT

## 2022-10-21 PROCEDURE — 99284 EMERGENCY DEPT VISIT MOD MDM: CPT

## 2022-10-21 PROCEDURE — 85025 COMPLETE CBC W/AUTO DIFF WBC: CPT

## 2022-10-21 RX ORDER — LIDOCAINE HCL 20 MG/ML
5 VIAL (ML) INJECTION ONCE
Refills: 0 | Status: COMPLETED | OUTPATIENT
Start: 2022-10-21 | End: 2022-10-21

## 2022-10-21 RX ADMIN — Medication 5 MILLILITER(S): at 11:53

## 2022-10-21 NOTE — ED PROVIDER NOTE - OBJECTIVE STATEMENT
66M with asthma, lymphoma, BPH with history of retention / schwarz / constipation, now with recurrent urinary retention / pelvic pressure, has not urinated since yesterday, has also been decreasing PO fluids because he does not want to urinate.  Is also having constipation, is still passing stool but with straining, adherent to miralax QD.  Stool in small round chunks.  No dysuria or abdominal pain.

## 2022-10-21 NOTE — ED PROVIDER NOTE - NSFOLLOWUPCLINICS_GEN_ALL_ED_FT
Heart Failure Clinic       Visit Date: 2/23/2021  Cardiologist:  Dr. Isreal Huang, Dr Daisy Roberts ([facility])  Primary Care Physician: Dr. Thania Orozco MD    Greg Barrett is a 56 y.o. male who presents today for:  Chief Complaint   Patient presents with   Rekha Fernandez     b/p nurse visit       HPI:   Greg Barrett is a 56 y.o. male who presents to the office for a follow up patient visit in the heart failure clinic. Accompanied by wife, Tadeo Velez    TYPE HF: NICM (EF 30%) (viral illness years ago)   Device: ICD (2013)  HX: SYED, HLD, VT (Amiodarone)  [facility] 12/6/18 - Negative  Following w/ [facility] Advanced HF clinic = Dr Elizabeth Wolfe     Dry Wt:  250    Concerns today: Called stating BP was SBP 64 at home. Feeling dizzy, lightheaded. Vision little off. [facility] recommend to come local to be seen. Was admitted at [facility] last month for CHF - on Lasix gtt, but then issues w/ RYAN. Creat bumped to 2 range per pt. Appears was discharged on same diuretics but shortly after discharge Torsemide was increased to 40/day from 20/day.   BP usually around 120-130s    Patient has:  Chest Pain: no  SOB: no  Orthopnea/PND: no  SYED: no  Edema: no  Fatigue: yes  Abdominal bloating: no  Cough: no  Appetite: good  Any extra diuretic use: no  Weight gain: no  Home weight: down - been doing \"soup\" diet - down to 241# recently at home  Home blood pressure: normally around    Past Medical History:   Diagnosis Date    Acute kidney injury ([address]     Cardiomyopathy, dilated ([address]     VIRAL    Claustrophobia     Congestive heart failure (CHF) ([address]     Depression 12/26/2013    HTN (hypertension)     Hyperlipidemia     [employer] dual ICD 2/14/2014    Osteoarthritis     Osteoarthritis of spine with radiculopathy, lumbar region 12/28/2015    Spinal stenosis     Unspecified sleep apnea     unable to wear CPAP     Past Surgical History:   Procedure Laterality Date    BACK SURGERY  8/26/2014    L4- S1 decompression, L4-5 PSF and TLIF    BACK SURGERY 2013    CARD[facility]AC [facility]IZ[facility]  10/[facility]    [facility]    CARDIAC DEFIBRILLATOR PLACEMENT  2013    [employer]    CARDIOVASCULAR STRESS TEST  09/2013    CARPAL TUNNEL RELEASE Right     CERVICAL FUSION  05/17/2017    [address]    COLONOSCOPY      DIAGNOSTIC CARDIAC CATH LAB PROCEDURE  07/2020    [facility]    DOPPLER ECHOCARDIOGRAPHY  09/2013    DOPPLER ECHOCARDIOGRAPHY  09/2013    [facility]    ENDOSCOPY, COLON, DIAGNOSTIC      FACET JOINT INJECTION Right 5/26/2020    L-facet RFA @ L2-3,L3-4,L4-5  RIGHT performed by Brynn Pelayo MD at [address] [facility] [address] [place] [address] Left 7/2/2019    Left SI injection performed by Brynn Pelayo MD at [address] [place] [address] Left 8/26/2019    Left SI injection # 2 performed by Brynn Pelayo MD [address]  ? ??    umbilical    LUMBAR SPINE SURGERY Left 9/11/2017    LUMBAR RFA L2-3, L3-4, L4-5, L5-S1 LEFT SIDE performed by Brynn Pelayo MD at [address] [facility] Right 10/31/2017    LUMBAR FACET RFA @ L2-3, L3-4, L4-5 AND L5-S1 RIGHT SIDE performed by Brynn Pelayo MD at [address] [facility] Left 4/11/2019    L-RFA @ L2-3, L3-4, L4-5, L5-S1 LEFT SIDE FIRST.  performed by Brynn Pelayo MD at [address] [facility] Left 10/3/2019    LEFT SI RFA performed by Brynn Pelayo MD at [address] [place] [address]  2015    OTHER SURGICAL HISTORY  10/9/2015    C3-6 ACDF    OTHER SURGICAL HISTORY  01/18/2016    FACET INJECTIONS L3-L4 L4-L5 L5 S1    OTHER SURGICAL HISTORY  2-8-2016    RFA - L3-S1    OTHER SURGICAL HISTORY N/A 03-21-16    cervical epidural block C7    OTHER SURGICAL HISTORY Bilateral 05-16-16    cervical facet block C7-T1    OTHER SURGICAL HISTORY  08/01/2016    CERVICAL ABLATION    OTHER SURGICAL HISTORY Left 09/11/2017    Lumbar RFA at L2-3, L3-4, L4-5, L5-S1    OTHER SURGICAL HISTORY Right 10/31/2017    Lumbar RFA at L2-3, L3-4, L4-5, L5-S1    OTHER SURGICAL HISTORY Right 05/09/2018    Excision scalp mass right forehead    [place] EXC SKIN BENIG <5MM REMAINDR BODY Right 5/9/2018    EXCISION RIGHT FOREHEAD CYST performed by Ebony Shukla MD at [address] Left 6/19/2018    L-RFA @ L2-3, L3-4, L4-5, L5-S1 LEFT SIDE FIRST. performed by Ronal Roman MD at [address]  1980's???    TONSILLECTOMY  1980's??    VASECTOMY  ? ??     Family History   Problem Relation Age of Onset    Heart Disease Father     Coronary Art Dis Father     Heart Attack Father     High Blood Pressure Father     Parkinsonism Father      Social History     Tobacco Use    Smoking status: Never Smoker    Smokeless tobacco: Never Used   Substance Use Topics    Alcohol use:  Yes     Alcohol/week: 6.0 standard drinks     Types: 6 Standard drinks or equivalent per week     Comment: social     Current Outpatient Medications   Medication Sig Dispense Refill    finasteride (PROSCAR) 5 MG tablet Take 5 mg by mouth daily      JARDIANCE 10 MG tablet 10 mg       amitriptyline (ELAVIL) 10 MG tablet Take 10 mg by mouth nightly      potassium chloride (KLOR-CON) 10 MEQ extended release tablet Take 10 mEq by mouth daily      cyclobenzaprine (FLEXERIL) 5 MG tablet Take 5 mg by mouth daily as needed for Muscle spasms      torsemide (DEMADEX) 20 MG tablet Take 1 tablet by mouth daily 90 tablet 1    metoprolol succinate (TOPROL XL) 25 MG extended release tablet Take 0.5 tablets by mouth daily      amiodarone (CORDARONE) 200 MG tablet Take 1 tablet twice a day for 7 days, then decrease to 1 tablet by mouth once a day 90 tablet 1    atorvastatin (LIPITOR) 80 MG tablet Take 80 mg by mouth nightly      digoxin (LANOXIN) 125 MCG tablet Take 0.125 mg by mouth every other day       valsartan (DIOVAN) 40 MG tablet Take 20 mg by mouth 2 times daily       Magnesium 500 MG CAPS Take by mouth daily      zinc sulfate (ZINCATE) 220 (50 Zn) MG capsule Take 50 mg by mouth daily      allopurinol (ZYLOPRIM) 300 MG tablet Take 300 mg by mouth daily      Acetaminophen (TYLENOL ARTHRITIS PAIN PO) Take by mouth 3 times daily as needed       No current facility-administered medications for this visit. No Known Allergies    SUBJECTIVE:   Review of Systems   Constitutional: Positive for fatigue. Negative for activity change and appetite change. Respiratory: Negative for cough and shortness of breath. Cardiovascular: Negative for chest pain, palpitations and leg swelling. Gastrointestinal: Negative for abdominal distention. Neurological: Positive for dizziness. Negative for weakness, light-headedness and headaches. Hematological: Negative for adenopathy. Psychiatric/Behavioral: Negative for sleep disturbance. OBJECTIVE:   Today's Vitals:  BP (!) 74/48 (Site: Right Upper Arm, Position: Sitting)   Pulse 67   Ht 5' 9\" (1.753 m)   Wt 250 lb 2 oz (113.5 kg)   SpO2 95%   BMI 36.94 kg/m²     Physical Exam  Vitals signs reviewed. Constitutional:       General: He is not in acute distress. Appearance: Normal appearance. He is well-developed. He is not diaphoretic. HENT:      Head: Normocephalic and atraumatic. Mouth/Throat:      Mouth: Mucous membranes are dry. Eyes:      Conjunctiva/sclera: Conjunctivae normal.   Neck:      Musculoskeletal: Normal range of motion and neck supple. Comments: No JVD  Cardiovascular:      Rate and Rhythm: Normal rate and regular rhythm. Heart sounds: Normal heart sounds. No murmur. Pulmonary:      Effort: Pulmonary effort is normal. No respiratory distress. Breath sounds: Normal breath sounds. No wheezing or rales. Abdominal:      General: Bowel sounds are normal. There is no distension. Palpations: Abdomen is soft. Tenderness: There is no abdominal tenderness. Musculoskeletal: Normal range of motion. Right lower leg: No edema. Left lower leg: No edema. Skin:     General: Skin is warm and dry. Capillary Refill: Capillary refill takes less than 2 seconds. Comments: Skin turgor > 3 sec  Mouth dry   Neurological:      Mental Status: He is alert and oriented to person, place, and time. Coordination: Coordination normal.   Psychiatric:         Behavior: Behavior normal.         Wt Readings from Last 3 Encounters:   02/23/21 250 lb 2 oz (113.5 kg)   12/17/20 259 lb 9.6 oz (117.8 kg)   12/11/20 249 lb (112.9 kg)     BP Readings from Last 3 Encounters:   02/23/21 (!) 74/48   12/17/20 120/78   11/20/20 124/70     Pulse Readings from Last 3 Encounters:   02/23/21 67   12/17/20 70   11/20/20 64     Body mass index is 36.94 kg/m². ECHO:   CONCLUSIONS:  - Technically difficult exam due to suboptimal positioning and body habitus. - Exam indication: Cardiomyopathy  - The left ventricle is severely dilated. Left ventricular systolic function is   severely decreased. EF = 24 ± 5% (2D biplane) Left ventricular diastolic function   was not evaluated due to inconsistent or technically suboptimal data. - The right ventricle is normal in size. Right ventricular systolic function is   normal.  - The left atrial cavity is mildly dilated. - Exam was compared with the prior  echocardiographic exam performed on   7/23/2020. There are similar findings. Electronically signed by Tamara Orozco MD on 1/15/2021 at 2:15:04 PM    ECHO  CONCLUSIONS:  - Exam indication: Initial evaluation of Heart Failure  - The left ventricle is moderately dilated. Left ventricular systolic function is   severely decreased. EF = 30 ± 5% (2D biplane) Definity contrast used for   endocardial border detection. Indeterminate left ventricular diastolic dysfunction   due to tachycardia.  Frequent ectopy throughout the exam.  - The right ventricle is normal in size. Right ventricular systolic function is   normal.  - The left atrial cavity is moderately dilated. - The right atrial cavity is dilated. - There is moderate (2+) holosystolic mitral valve regurgitation.  - Estimated right ventricular systolic pressure is 57 mmHg consistent with   moderate pulmonary hypertension. Estimated right atrial pressure is 8 mmHg based   on IVC assessment. - Exam was compared with the prior  echocardiographic exam performed on   12/10/01, LV function has decreased compared to the prior. Electronically signed by Miya Long MD on 7/23/2020 at 6:27:07 PM     CATH/STRESS:   Imaging:Results:Calculated gated LVEF 25 %.   technically difficult study  patchy uptake  no clear ischemia pattern  abnormal dilated cardiomyopathy  EF 25%     Conclusions      Summary   This Nuclear Medicine study was negative for ischemia .   dilated cardiomyopathy   difficult scan      Recommendation   Clinical correlation is recommended.      Signatures      ----------------------------------------------------------------   Electronically signed by Willy Albert MD (Interpreting   Cardiologist) on 12/06/2018 at 17:22   ---------------------------------------------------------------    Results reviewed:  BNP: No results found for: BNP  CBC:   Lab Results   Component Value Date    WBC 7.7 09/12/2020    RBC 4.00 09/12/2020    RBC 4.49 07/09/2020    HGB 12.4 09/12/2020    HCT 37.9 09/12/2020     09/12/2020     CMP:    Lab Results   Component Value Date     02/19/2021    K 3.4 02/19/2021    K 3.8 05/02/2020     02/19/2021    CO2 31 02/19/2021    BUN 7 02/19/2021    CREATININE 1.3 02/19/2021    LABGLOM 56 02/19/2021    GLUCOSE 93 02/19/2021    GLUCOSE 100 04/21/2020    CALCIUM 9.4 02/19/2021     Hepatic Function Panel:    Lab Results   Component Value Date    ALKPHOS 112 09/12/2020    ALT 17 09/12/2020    AST 17 09/12/2020    [facility]  Urology  [address], [address]rd [address]  [place], [place] [address]  Phone: [phone]  Fax:

## 2022-10-21 NOTE — ED ADULT NURSE NOTE - NSIMPLEMENTINTERV_GEN_ALL_ED
Implemented All Fall Risk Interventions:  [place] to call system. Call bell, personal items and telephone within reach. Instruct patient to call for assistance. Room bathroom lighting operational. Non-slip footwear when patient is off stretcher. Physically safe environment: no spills, clutter or unnecessary equipment. Stretcher in lowest position, wheels locked, appropriate side rails in place. Provide visual cue, wrist band, yellow gown, etc. Monitor gait and stability. Monitor for mental status changes and reorient to person, place, and time. Review medications for side effects contributing to fall risk. Reinforce activity limits and safety measures with patient and family.

## 2022-10-21 NOTE — ED PROVIDER NOTE - NSFOLLOWUPINSTRUCTIONS_ED_ALL_ED_FT
Acute Urinary Retention, Male       Acute urinary retention is a condition in which a person is unable to pass urine or can only pass a little urine. This condition can happen suddenly and last for a short time. If left untreated, it can become long-term (chronic) and result in kidney damage or other serious complications.      What are the causes?    This condition may be caused by:  •Obstruction or narrowing of the tube that drains the bladder (urethra). This may be caused by surgery, problems with nearby organs, or injury to the bladder or urethra.      •Problems with the nerves in the bladder.      •Tumors in the area of the pelvis, bladder, or urethra.      •Certain medicines.      •Bladder or urinary tract infection.      •Constipation.        What increases the risk?    This condition is more likely to develop in older men. As men age, their prostate may become larger and may start to press or squeeze on the bladder or the urethra. Other chronic health conditions can increase the risk of acute urinary retention. These include:  •Diseases such as multiple sclerosis.      •Spinal cord injuries.      •Diabetes.      •Degenerative cognitive conditions, such as delirium or dementia.      •Psychological conditions. A man may hold his urine due to trauma or because he does not want to use the bathroom.        What are the signs or symptoms?    Symptoms of this condition include:  •Trouble urinating.      •Pain in the lower abdomen.        How is this diagnosed?    This condition is diagnosed based on a physical exam and your medical history. You may also have other tests, including:  •An ultrasound of the bladder or kidneys or both.      •Blood tests.      •A urine analysis.      •Additional tests may be needed, such as a CT scan, MRI, and kidney or bladder function tests.        How is this treated?    Treatment for this condition may include:  •Medicines.      •Placing a thin, sterile tube (catheter) into the bladder to drain urine out of the body. This is called an indwelling urinary catheter. After it is inserted, the catheter is held in place with a small balloon that is filled with sterile water. Urine drains from the catheter into a collection bag outside of the body.      •Behavioral therapy.      •Treatment for other conditions.      If needed, you may be treated in the hospital for kidney function problems or to manage other complications.      Follow these instructions at home:    Medicines     •Take over-the-counter and prescription medicines only as told by your health care provider. Avoid certain medicines, such as decongestants, antihistamines, and some prescription medicines. Do not take any medicine unless your health care provider approves.      •If you were prescribed an antibiotic medicine, take it as told by your health care provider. Do not stop using the antibiotic even if you start to feel better.      General instructions     • Do not use any products that contain nicotine or tobacco. These products include cigarettes, chewing tobacco, and vaping devices, such as e-cigarettes. If you need help quitting, ask your health care provider.      •Drink enough fluid to keep your urine pale yellow.      •If you have an indwelling urinary catheter, follow the instructions from your health care provider.      •Monitor any changes in your symptoms. Tell your health care provider about any changes.      •If instructed, monitor your blood pressure at home. Report changes as told by your health care provider.      •Keep all follow-up visits. This is important.        Contact a health care provider if:    •You have uncomfortable bladder contractions that you cannot control (spasms).      •You leak urine with the spasms.        Get help right away if:    •You have chills or a fever.      •You have blood in your urine.    •You have a catheter and the following happens:  •Your catheter stops draining urine.      •Your catheter falls out.          Summary    •Acute urinary retention is a condition in which a person is unable to pass urine or can only pass a little urine. If left untreated, this condition can result in kidney damage or other serious complications.      •An enlarged prostate may cause this condition. As men age, their prostate gland may become larger and may press or squeeze on the bladder or the urethra.      •Treatment for this condition may include medicines and placement of an indwelling urinary catheter.      •Monitor any changes in your symptoms. Tell your health care provider about any changes.      This information is not intended to replace advice given to you by your health care provider. Make sure you discuss any questions you have with your health care provider.

## 2022-10-21 NOTE — ED PROVIDER NOTE - PATIENT PORTAL LINK FT
You can access the FollowMyHealth Patient Portal offered by [facility] by registering at the following website: [email or web address]. By joining [employer]’s FollowMyHealth portal, you will also be able to view your health information using other applications (apps) compatible with our system.

## 2022-10-21 NOTE — ED ADULT NURSE NOTE - OBJECTIVE STATEMENT
66y M, presents to the ED c/o urinary retention and constipation since yesterday. Pt states, "I have been straining to urinate since yesterday. Only small amounts are coming out." Pt is also reporting constipation, takes mirlax with no relief. Denies chest pain, abdominal pain, SOB, fever, chills, NVD. Pt A&Ox4, speaking in clear/full sentences,  vital signs stable.

## 2022-10-21 NOTE — ED PROVIDER NOTE - CLINICAL SUMMARY MEDICAL DECISION MAKING FREE TEXT BOX
Yes # urinary retention: concern for acute on chronic urinary retention, likely 2/2 BPH.  Will place [place].  R/o UTI as cause of acute exacerbation, although clinical suspicion low given no fever or dysuria.    # constipation: no abdominal tenderness and still passing stool, not consistent with bowel obstruction, likely mild constipation 2/2 multifactorial combination of bladder pressure and decreased PO fluids, will refill patient's miralax and treat urinary retention.  No fever or abdominal pain suggestive of acute intraabdominal process ?SBO ?perforation ?diverticulitis. # urinary retention: concern for acute on chronic urinary retention, likely 2/2 BPH.  Will place [place].  R/o UTI as cause of acute exacerbation, although clinical suspicion low given no fever or dysuria.  R/o gross metabolic abnormality ?junior 2/2 retention.    # constipation: no abdominal tenderness and still passing stool, not consistent with bowel obstruction, likely mild constipation 2/2 multifactorial combination of bladder pressure and decreased PO fluids, will refill patient's miralax and treat urinary retention.  No fever or abdominal pain suggestive of acute intraabdominal process ?SBO ?perforation ?diverticulitis.

## 2022-10-21 NOTE — ED ADULT NURSE NOTE - CHIEF COMPLAINT
"              After Visit Summary   11/14/2017    Jl Oquendo    MRN: [id number]           Patient Information     Date Of Birth          2009        Visit Information        Provider Department      11/14/2017 2:30 PM Jaime Coppola MD [facility]        Today's Diagnoses     ADHD (attention deficit hyperactivity disorder), inattentive type           Follow-ups after your visit        Who to contact     If you have questions or need follow up information about today's clinic visit or your schedule please contact [facility] directly at [phone].  Normal or non-critical lab and imaging results will be communicated to you by mySBXhart, letter or phone within 4 business days after the clinic has received the results. If you do not hear from us within 7 days, please contact the clinic through Revionicst or phone. If you have a critical or abnormal lab result, we will notify you by phone as soon as possible.  Submit refill requests through S[employer] TV or call your pharmacy and they will forward the refill request to us. Please allow 3 business days for your refill to be completed.          Additional Information About Your Visit        MyChart Information     [employer] TV lets you send messages to your doctor, view your test results, renew your prescriptions, schedule appointments and more. To sign up, go to [email or web address], contact your [place] clinic or call [phone] during business hours.            Care EveryWhere ID     This is your Care EveryWhere ID. This could be used by other organizations to access your [place] medical records  [id number]        Your Vitals Were     Pulse Temperature Height Pulse Oximetry BMI (Body Mass Index)       82 97.3  F (36.3  C) (Oral) 4' 7.87\" (1.419 m) 98% 16.67 kg/m2        Blood Pressure from Last 3 Encounters:   11/14/17 102/59   09/12/17 103/60   05/18/17 105/60    Weight from Last 3 Encounters: "   11/14/17 74 lb (33.6 kg) (45 %)*   09/12/17 74 lb (33.6 kg) (49 %)*   05/18/17 75 lb 12.8 oz (34.4 kg) (62 %)*     * Growth percentiles are based on [facility] 2-20 Years data.              Today, you had the following     No orders found for display         Today's Medication Changes          These changes are accurate as of: 11/14/17 11:59 PM.  If you have any questions, ask your nurse or doctor.               Start taking these medicines.        Dose/Directions    * lisdexamfetamine 40 MG capsule   Commonly known as:  VYVANSE   Used for:  ADHD (attention deficit hyperactivity disorder), inattentive type   Started by:  Jaime Coppola MD        Dose:  40 mg   Take 1 capsule (40 mg) by mouth daily   Quantity:  30 capsule   Refills:  0       * lisdexamfetamine 40 MG capsule   Commonly known as:  VYVANSE   Used for:  ADHD (attention deficit hyperactivity disorder), inattentive type   Started by:  Jaime Coppola MD        Dose:  40 mg   Start taking on:  12/15/2017   Take 1 capsule (40 mg) by mouth daily   Quantity:  30 capsule   Refills:  0       * lisdexamfetamine 40 MG capsule   Commonly known as:  VYVANSE   Used for:  ADHD (attention deficit hyperactivity disorder), inattentive type   Started by:  Jaime Coppola MD        Dose:  40 mg   Start taking on:  1/15/2018   Take 1 capsule (40 mg) by mouth daily   Quantity:  30 capsule   Refills:  0       * Notice:  This list has 3 medication(s) that are the same as other medications prescribed for you. Read the directions carefully, and ask your doctor or other care provider to review them with you.         Where to get your medicines      Some of these will need a paper prescription and others can be bought over the counter.  Ask your nurse if you have questions.     Bring a paper prescription for each of these medications     lisdexamfetamine 40 MG capsule    lisdexamfetamine 40 MG capsule    lisdexamfetamine 40 MG capsule                Primary Care Provider  Office Phone # Fax #    Lonny Kael Freeman [phone] [address], [place], operated [facility] [address]        Equal Access to Services     RENETTASHLOMO ROBYN : Hadjackson aad ku hadgrahmamaylin Soguera, waaxda luqadaha, qaybta kaalmada cinda, seamus camarena indiralennie perez laBrezeylilliam kohli. So [facility] [phone].    ATENCIÓN: Si habla español, tiene a valadez disposición servicios gratuitos de asistencia lingüística. Llame al [phone].    We comply with applicable federal civil rights laws and [place] laws. We do not discriminate on the basis of race, color, national origin, age, disability, sex, sexual orientation, or gender identity.            Thank you!     Thank you for choosing [facility]  for your care. Our goal is always to provide you with excellent care. Hearing back from our patients is one way we can continue to improve our services. Please take a few minutes to complete the written survey that you may receive in the mail after your visit with us. Thank you!             Your Updated Medication List - Protect others around you: Learn how to safely use, store and throw away your medicines at www.disposemymeds.org.          This list is accurate as of: 11/14/17 11:59 PM.  Always use your most recent med list.                   Brand Name Dispense Instructions for use Diagnosis    albuterol 108 (90 BASE) MCG/ACT Inhaler    PROAIR HFA/PROVENTIL HFA/VENTOLIN HFA    1 Inhaler    Inhale 2 puffs into the lungs every 4 hours as needed for shortness of breath / dyspnea or wheezing        * lisdexamfetamine 40 MG capsule    VYVANSE    30 capsule    Take 1 capsule (40 mg) by mouth daily    ADHD (attention deficit hyperactivity disorder), inattentive type       * lisdexamfetamine 40 MG capsule   Start taking on:  12/15/2017    VYVANSE    30 capsule    Take 1 capsule (40 mg) by mouth daily    ADHD (attention deficit hyperactivity disorder), inattentive type       * lisdexamfetamine 40 MG  capsule   Start taking on:  1/15/2018    VYVANSE    30 capsule    Take 1 capsule (40 mg) by mouth daily    ADHD (attention deficit hyperactivity disorder), inattentive type       loratadine 10 MG tablet    CLARITIN    30 tablet    TAKE ONE TABLET BY MOUTH ONE TIME DAILY    Gammaherpesviral mononucleosis without complication       * Notice:  This list has 3 medication(s) that are the same as other medications prescribed for you. Read the directions carefully, and ask your doctor or other care provider to review them with you.       The patient is a 66y Male complaining of urinary retention.

## 2022-10-21 NOTE — ED PROVIDER NOTE - PROGRESS NOTE DETAILS
POCUS bladder 5m2u93dx, estimated 400mL, patient acutely retaining. Patient with ~300mL from Colby, no significant post-obstructive diuresis, no DEX or other gross metabolic abnormality on labs.  No UTI on urine.  Plan to discharge home with leg bag, urology f/u, return precautions.

## 2022-10-23 ENCOUNTER — EMERGENCY (EMERGENCY)
Facility: HOSPITAL | Age: 67
LOS: 1 days | Discharge: HOME | End: 2022-10-23
Attending: EMERGENCY MEDICINE | Admitting: EMERGENCY MEDICINE
Payer: MEDICARE

## 2022-10-23 VITALS
DIASTOLIC BLOOD PRESSURE: 89 MMHG | HEART RATE: 105 BPM | RESPIRATION RATE: 18 BRPM | TEMPERATURE: 98 F | HEIGHT: 67 IN | SYSTOLIC BLOOD PRESSURE: 149 MMHG | WEIGHT: 164.91 LBS | OXYGEN SATURATION: 98 %

## 2022-10-23 VITALS
DIASTOLIC BLOOD PRESSURE: 84 MMHG | OXYGEN SATURATION: 96 % | HEART RATE: 77 BPM | TEMPERATURE: 98 F | SYSTOLIC BLOOD PRESSURE: 152 MMHG | RESPIRATION RATE: 18 BRPM

## 2022-10-23 DIAGNOSIS — Z98.89 OTHER SPECIFIED POSTPROCEDURAL STATES: Chronic | ICD-10-CM

## 2022-10-23 LAB
APPEARANCE UR: CLEAR — SIGNIFICANT CHANGE UP
BACTERIA # UR AUTO: PRESENT /HPF
BILIRUB UR-MCNC: NEGATIVE — SIGNIFICANT CHANGE UP
COLOR SPEC: YELLOW — SIGNIFICANT CHANGE UP
COMMENT - URINE: SIGNIFICANT CHANGE UP
DIFF PNL FLD: ABNORMAL
EPI CELLS # UR: SIGNIFICANT CHANGE UP /HPF (ref 0–5)
GLUCOSE UR QL: NEGATIVE — SIGNIFICANT CHANGE UP
KETONES UR-MCNC: NEGATIVE — SIGNIFICANT CHANGE UP
LEUKOCYTE ESTERASE UR-ACNC: ABNORMAL
NITRITE UR-MCNC: NEGATIVE — SIGNIFICANT CHANGE UP
PH UR: 6 — SIGNIFICANT CHANGE UP (ref 5–8)
PROT UR-MCNC: 30 MG/DL
RBC CASTS # UR COMP ASSIST: > 10 /HPF
SP GR SPEC: 1.02 — SIGNIFICANT CHANGE UP (ref 1–1.03)
UROBILINOGEN FLD QL: 0.2 E.U./DL — SIGNIFICANT CHANGE UP
WBC UR QL: ABNORMAL /HPF

## 2022-10-23 PROCEDURE — 99283 EMERGENCY DEPT VISIT LOW MDM: CPT

## 2022-10-23 PROCEDURE — 87086 URINE CULTURE/COLONY COUNT: CPT

## 2022-10-23 PROCEDURE — 81001 URINALYSIS AUTO W/SCOPE: CPT

## 2022-10-23 RX ORDER — METHOCARBAMOL 500 MG/1
2 TABLET, FILM COATED ORAL
Qty: 30 | Refills: 0
Start: 2022-10-23 | End: 2022-10-27

## 2022-10-23 RX ORDER — CEFDINIR 250 MG/5ML
1 POWDER, FOR SUSPENSION ORAL
Qty: 14 | Refills: 0
Start: 2022-10-23 | End: 2022-10-29

## 2022-10-23 RX ORDER — METHOCARBAMOL 500 MG/1
750 TABLET, FILM COATED ORAL ONCE
Refills: 0 | Status: COMPLETED | OUTPATIENT
Start: 2022-10-23 | End: 2022-10-23

## 2022-10-23 RX ADMIN — METHOCARBAMOL 750 MILLIGRAM(S): 500 TABLET, FILM COATED ORAL at 15:51

## 2022-10-23 NOTE — ED PROVIDER NOTE - CLINICAL SUMMARY MEDICAL DECISION MAKING FREE TEXT BOX
Pt p/w suprapubic bladder discomfort, b/l LBP which appears more MSK in origin. Colby draining appropriately and collapsed around bladder. Afebrile, no CVAT. Benign abd. Labs checked 2 days ago, reviewed. Will check UA, tx if +, f/u Uro

## 2022-10-23 NOTE — ED PROVIDER NOTE - OBJECTIVE STATEMENT
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 He denies f/c, n/v. He states he has b/l LBP, and sometimes feels suprapubic discomfort.

## 2022-10-23 NOTE — ED PROVIDER NOTE - PROGRESS NOTE DETAILS
Equivocal urine but a/w neg urine 2 days ago, again w/ recent catheterization, vague sx. Will tx. UCx sent. F/u Urology. Pt requesting muscle relaxant for lbp.

## 2022-10-23 NOTE — ED ADULT NURSE NOTE - OBJECTIVE STATEMENT
Patient c/o feeling constipated, having small bowel movements (last was today). patient also c/o decreased urine output from Colby cath (14 Syriac inserted on 10/21/22) and feeling like his bladder is full. Small amount of urine noted in leg bag

## 2022-10-23 NOTE — ED ADULT TRIAGE NOTE - CHIEF COMPLAINT QUOTE
Pt s/p schwarz catheter insertion for urinary retention on friday presents reporting the catheter isnt draining. Pt also reports constipation, last BM today. Pt reports bilateral lower back pain.

## 2022-10-23 NOTE — ED PROVIDER NOTE - NS ED ROS FT
Constitutional: No fever or chills.   Eyes: No pain, blurry vision, or discharge.  ENMT: No hearing changes, pain, discharge or infections. No neck pain or stiffness.  Cardiac: No chest pain, SOB or edema. No chest pain with exertion.  Respiratory: No cough or respiratory distress. No hemoptysis. No history of asthma or RAD.  GI: No nausea, vomiting, diarrhea or abdominal pain.  : See HPI  MS: No myalgia, muscle weakness, joint pain   Neuro: No headache or weakness. No LOC.  Skin: No skin rash.   Endocrine: No history of thyroid disease or diabetes.  Except as documented in the HPI, all other systems are negative.

## 2022-10-23 NOTE — ED PROVIDER NOTE - PHYSICAL EXAMINATION
Constitutional: Well appearing, awake, alert, oriented to person, place, time/situation and in no apparent distress.  ENMT: Airway patent. Normal MM  Eyes: Clear bilaterally  Cardiac: Normal rate, regular rhythm.    Respiratory: No increased WOB, tachypnea, hypoxia, or accessory mm use. Pt speaks in full sentences.   Gastrointestinal: Abd soft, NT, ND, NABS. No guarding, rebound, or rigidity. No pulsatile abdominal masses. No organomegaly appreciated. No CVAT. Urine collecting bag w/ urine present, somewhat concentrated. no gross blood. Bedside ultrasound shows bladder collapsed around catheter bladder.  Musculoskeletal: Range of motion is not limited. no midline spinal ttp. + mild b/l paraspinal mm ttp  Neuro: Alert and oriented x 3, face symmetric and speech fluent. Strength 5/5 x 4 ext and symmetric, nml gross motor movement, nml gait. No focal deficits noted.  Skin: Skin normal color for race, warm, dry and intact. No evidence of rash.  Psych: Alert and oriented to person, place, time/situation. normal mood and affect. no apparent risk to self or others.

## 2022-10-23 NOTE — ED PROVIDER NOTE - PATIENT PORTAL LINK FT
You can access the FollowMyHealth Patient Portal offered by [facility] by registering at the following website: [email or web address]. By joining [facility]’s FollowMyHealth portal, you will also be able to view your health information using other applications (apps) compatible with our system.

## 2022-10-23 NOTE — ED PROVIDER NOTE - NSFOLLOWUPINSTRUCTIONS_ED_ALL_ED_FT
You are being treated for a urinary tract infection. Please take the antibiotics (Cefdinir, like penicillin) as prescribed, until completed.     Call your urologist tomorrow for follow-up appointment.     Urinary Tract Infection    A urinary tract infection (UTI) is an infection of any part of the urinary tract, which includes the kidneys, ureters, bladder, and urethra. Risk factors include ignoring your need to urinate, wiping back to front if female, being an uncircumcised male, and having diabetes or a weak immune system. Symptoms include frequent urination, pain or burning with urination, foul smelling urine, cloudy urine, pain in the lower abdomen, blood in the urine, and fever. If you were prescribed an antibiotic medicine, take it as told by your health care provider. Do not stop taking the antibiotic even if you start to feel better.    SEEK IMMEDIATE MEDICAL CARE IF YOU HAVE ANY OF THE FOLLOWING SYMPTOMS: severe back or abdominal pain, fever, inability to keep fluids or medicine down, dizziness/lightheadedness, or a change in mental status.       Indwelling Urinary Catheter Care, Adult      An indwelling urinary catheter is a thin, flexible, germ-free (sterile) tube that is placed into the bladder to help drain urine out of the body. The catheter is inserted into the part of the body that drains urine from the bladder (urethra). Urine drains from the catheter into a drainage bag outside of the body.    Taking good care of your catheter will keep it working properly and help to prevent problems from developing.      What are the risks?    •Bacteria may get into your bladder and cause a urinary tract infection.      •Urine flow can become blocked. This can happen if the catheter is not working correctly, or if you have sediment or a blood clot in your bladder or the catheter.      •Tissue near the catheter may become irritated and bleed.        How to wear your catheter and your drainage bag    Supplies needed     •Adhesive tape or a leg strap.      •Alcohol wipe or soap and water (if you use tape).      •A clean towel (if you use tape).      •Overnight drainage bag.      •Smaller drainage bag (leg bag).      Wearing your catheter and bag     Use adhesive tape or a leg strap to attach your catheter to your leg.  •Make sure the catheter is not pulled tight.      •If a leg strap gets wet, replace it with a dry one.    •If you use adhesive tape:  1.Use an alcohol wipe or soap and water to wash off any stickiness on your skin where you had tape before.      2.Use a clean towel to pat-dry the area.      3.Apply the new tape.        You should have received a large overnight drainage bag and a smaller leg bag that fits underneath clothing.   •You may wear the overnight bag at any time, but you should not wear the leg bag at night.      •Always wear the leg bag below your knee.      •Make sure the overnight drainage bag is always lower than the level of your bladder, but do not let it touch the floor. Before you go to sleep, hang the bag inside a wastebasket that is covered by a clean plastic bag.        How to care for your skin around the catheter      Female anatomy showing the bladder, labia, and urethra and an indwelling urinary catheter in the bladder.       Male anatomy showing the bladder, penis, and urethra and an indwelling urinary catheter in the bladder.     Supplies needed     •A clean washcloth.      •Water and mild soap.      •A clean towel.      Caring for your skin and catheter   •Every day, use a clean washcloth and soapy water to clean the skin around your catheter.  1.Wash your hands with soap and water.      2.Wet a washcloth in warm water and mild soap.    3.Clean the skin around your urethra.•If you are female:  •Use one hand to gently spread the folds of skin around your vagina (labia).      •With the washcloth in your other hand, wipe the inner side of your labia on each side. Do this in a front-to-back direction.      •If you are male:  •Use one hand to pull back any skin that covers the end of your penis (foreskin).      •With the washcloth in your other hand, wipe your penis in small circles. Start wiping at the tip of your penis, then move outward from the catheter.      •Move the foreskin back in place, if this applies.          4.With your free hand, hold the catheter close to where it enters your body. Keep holding the catheter during cleaning so it does not get pulled out.    5.Use your other hand to clean the catheter with the washcloth.  •Only wipe downward on the catheter, toward the bag.      •Do not wipe upward toward your body, because that may push bacteria into your urethra and cause infection.        6.Use a clean towel to pat-dry the catheter and the skin around it. Make sure to wipe off all soap.      7.Wash your hands with soap and water.        •Shower every day. Do not take baths.      • Do not use cream, ointment, or lotion on the area where the catheter enters your body, unless your health care provider tells you to do that.      • Do not use powders, sprays, or lotions on your genital area.    •Check your skin around the catheter every day for signs of infection. Check for:  •Redness, swelling, or pain.      •Fluid or blood.      •Warmth.      •Pus or a bad smell.          How to empty the drainage bag    Supplies needed     •Rubbing alcohol.      •Gauze pad or cotton ball.      •Adhesive tape or a leg strap.      Emptying the bag     Empty your drainage bag (your overnight drainage bag or your leg bag) when it is ?–½ full, or at least 2–3 times a day. Clean the drainage bag according to the 's instructions or as told by your health care provider.  1.Wash your hands with soap and water.      2.Detach the drainage bag from your leg.      3.Hold the drainage bag over the toilet or a clean container. Make sure the drainage bag is lower than your hips and bladder. This stops urine from going back into the tubing and into your bladder.      4.Open the pour spout at the bottom of the bag.      5.Empty the urine into the toilet or container. Do not let the pour spout touch any surface. This precaution is important to prevent bacteria from getting in the bag and causing infection.      6.Apply rubbing alcohol to a gauze pad or cotton ball.      7.Use the gauze pad or cotton ball to clean the pour spout.      8.Close the pour spout.      9.Attach the bag to your leg with adhesive tape or a leg strap.      10.Wash your hands with soap and water.        How to change the drainage bag    Supplies needed:     •Alcohol wipes.      •A clean drainage bag.      •Adhesive tape or a leg strap.      Changing the bag     Replace your drainage bag with a clean bag if it leaks, starts to smell bad, or looks dirty.  1.Wash your hands with soap and water.      2.Detach the dirty drainage bag from your leg.      3.Pinch the catheter with your fingers so that urine does not spill out.      4.Disconnect the catheter tube from the drainage tube at the connection valve. Do not let the tubes touch any surface.      5.Clean the end of the catheter tube with an alcohol wipe. Use a different alcohol wipe to clean the end of the drainage tube.      6.Connect the catheter tube to the drainage tube of the clean bag.      7.Attach the clean bag to your leg with adhesive tape or a leg strap. Avoid attaching the new bag too tightly.      8.Wash your hands with soap and water.        General instructions  Washing hands with soap and water.   • Never pull on your catheter or try to remove it. Pulling can damage your internal tissues.      •Always wash your hands before and after you handle your catheter or drainage bag. Use a mild, fragrance-free soap. If soap and water are not available, use hand .      •Always make sure there are no twists or bends (kinks) in the catheter tube.      •Always make sure there are no leaks in the catheter or drainage bag.      •Drink enough fluid to keep your urine pale yellow.      • Do not take baths, swim, or use a hot tub.      •If you are female, wipe from front to back after having a bowel movement.        Contact a health care provider if:    •Your urine is cloudy.      •Your urine smells unusually bad.      •Your catheter gets clogged.      •Your catheter starts to leak.      •Your bladder feels full.        Get help right away if:    •You have redness, swelling, or pain where the catheter enters your body.      •You have fluid, blood, pus, or a bad smell coming from the area where the catheter enters your body.      •The area where the catheter enters your body feels warm to the touch.      •You have a fever.      •You have pain in your abdomen, legs, lower back, or bladder.      •You see blood in the catheter.      •Your urine is pink or red.      •You have nausea, vomiting, or chills.      •Your urine is not draining into the bag.      •Your catheter gets pulled out.        Summary    •An indwelling urinary catheter is a thin, flexible, germ-free (sterile) tube that is placed into the bladder to help drain urine out of the body.      •The catheter is inserted into the part of the body that drains urine from the bladder (urethra).      •Take good care of your catheter to keep it working properly and help prevent problems from developing.      •Always wash your hands before and after you handle your catheter or drainage bag.      • Never pull on your catheter or try to remove it.      This information is not intended to replace advice given to you by your health care provider. Make sure you discuss any questions you have with your health care provider.

## 2022-10-24 ENCOUNTER — NON-APPOINTMENT (OUTPATIENT)
Age: 67
End: 2022-10-24

## 2022-10-24 LAB
CULTURE RESULTS: NO GROWTH — SIGNIFICANT CHANGE UP
SPECIMEN SOURCE: SIGNIFICANT CHANGE UP

## 2022-10-25 DIAGNOSIS — F32.9 MAJOR DEPRESSIVE DISORDER, SINGLE EPISODE, UNSPECIFIED: ICD-10-CM

## 2022-10-25 DIAGNOSIS — R73.09 OTHER ABNORMAL GLUCOSE: ICD-10-CM

## 2022-10-25 DIAGNOSIS — N39.0 URINARY TRACT INFECTION, SITE NOT SPECIFIED: ICD-10-CM

## 2022-10-25 DIAGNOSIS — Z88.8 ALLERGY STATUS TO OTHER DRUGS, MEDICAMENTS AND BIOLOGICAL SUBSTANCES STATUS: ICD-10-CM

## 2022-10-25 DIAGNOSIS — R33.9 RETENTION OF URINE, UNSPECIFIED: ICD-10-CM

## 2022-10-25 DIAGNOSIS — Z88.5 ALLERGY STATUS TO NARCOTIC AGENT: ICD-10-CM

## 2022-10-25 DIAGNOSIS — N40.1 BENIGN PROSTATIC HYPERPLASIA WITH LOWER URINARY TRACT SYMPTOMS: ICD-10-CM

## 2022-10-25 DIAGNOSIS — M54.50 LOW BACK PAIN, UNSPECIFIED: ICD-10-CM

## 2022-10-25 DIAGNOSIS — J45.31 MILD PERSISTENT ASTHMA WITH (ACUTE) EXACERBATION: ICD-10-CM

## 2022-10-25 DIAGNOSIS — Z98.890 OTHER SPECIFIED POSTPROCEDURAL STATES: ICD-10-CM

## 2022-10-25 DIAGNOSIS — C85.90 NON-HODGKIN LYMPHOMA, UNSPECIFIED, UNSPECIFIED SITE: ICD-10-CM

## 2022-10-25 DIAGNOSIS — R33.8 OTHER RETENTION OF URINE: ICD-10-CM

## 2022-10-25 DIAGNOSIS — Z91.013 ALLERGY TO SEAFOOD: ICD-10-CM

## 2022-10-26 ENCOUNTER — APPOINTMENT (OUTPATIENT)
Dept: HEMATOLOGY ONCOLOGY | Facility: CLINIC | Age: 67
End: 2022-10-26

## 2022-10-26 ENCOUNTER — APPOINTMENT (OUTPATIENT)
Dept: UROLOGY | Facility: CLINIC | Age: 67
End: 2022-10-26

## 2022-10-27 ENCOUNTER — APPOINTMENT (OUTPATIENT)
Dept: INFUSION THERAPY | Facility: CLINIC | Age: 67
End: 2022-10-27

## 2022-10-28 ENCOUNTER — APPOINTMENT (OUTPATIENT)
Dept: INFUSION THERAPY | Facility: CLINIC | Age: 67
End: 2022-10-28

## 2022-10-31 ENCOUNTER — APPOINTMENT (OUTPATIENT)
Dept: UROLOGY | Facility: CLINIC | Age: 67
End: 2022-10-31

## 2022-12-12 ENCOUNTER — APPOINTMENT (OUTPATIENT)
Dept: UROLOGY | Facility: CLINIC | Age: 67
End: 2022-12-12

## 2023-02-28 NOTE — DISCHARGE NOTE PROVIDER - DATE OF DISCHARGE SERVICE:
12-Oct-2022 Epidermal Autograft Text: The defect edges were debeveled with a #15 scalpel blade.  Given the location of the defect, shape of the defect and the proximity to free margins an epidermal autograft was deemed most appropriate.  Using a sterile surgical marker, the primary defect shape was transferred to the donor site. The epidermal graft was then harvested.  The skin graft was then placed in the primary defect and oriented appropriately.

## 2023-09-20 NOTE — ED ADULT NURSE NOTE - CAS TRG GEN SKIN COLOR
Pt's father called stating insurance is not covering the Preethi testing and is needing a prior authorization. Please contact pt's mom, Talia Ramsay, when this has been completed. Normal for race

## 2025-05-06 NOTE — H&P ADULT - NSHPPOADEEPVENOUSTHROMB_GEN_A_CORE
Chief Complaint  Covid Test Only (Pt c/o fatigue and little tightness in his chest yesterday. His son tested positive yesterday and he would like to be checked. )    Subjective          Brendon Melara presents to [facility]  History of Present Illness  Pt's son tested positive for covid yesterday- pt has had uri symptoms x 2 days  URI   This is a new problem. The current episode started in the past 7 days. The problem has been unchanged. There has been no fever. Associated symptoms include congestion and a sore throat. Pertinent negatives include no abdominal pain, chest pain, coughing, diarrhea, dysuria, ear pain, headaches, joint pain, joint swelling, nausea, neck pain, plugged ear sensation, rash, rhinorrhea, sinus pain, sneezing, swollen glands, vomiting or wheezing. He has tried nothing for the symptoms.              Objective   No Known Allergies  Immunization History   Administered Date(s) Administered    COVID-19 (MODERNA) 1st,2nd,3rd Dose Monovalent 2021, 06/15/2021    Tdap 2016     Past Medical History:   Diagnosis Date    Allergic     Arthritis     Asthma     Irritable bowel syndrome     Low back pain     Visual impairment       Past Surgical History:   Procedure Laterality Date    EYE SURGERY        Social History     Socioeconomic History    Marital status:    Tobacco Use    Smoking status: Former     Current packs/day: 0.00     Average packs/day: 0.5 packs/day for 5.0 years (2.5 ttl pk-yrs)     Types: Cigarettes, Pipe, Cigars     Start date: 2002     Quit date: 2007     Years since quittin.3     Passive exposure: Past    Smokeless tobacco: Former     Quit date:    Vaping Use    Vaping status: Never Used   Substance and Sexual Activity    Alcohol use: Yes    Drug use: Never    Sexual activity: Defer        Current Outpatient Medications:     albuterol (ACCUNEB) 0.63 MG/3ML nebulizer solution, , Disp: , Rfl:     EPINEPHrine (EPIPEN)  "0.3 MG/0.3ML solution auto-injector injection, USE AS DIRECTED FOR ACUTE ALLERGIC REACTION, Disp: , Rfl:     fexofenadine (Allegra Allergy) 180 MG tablet, Take 1 tablet by mouth Daily., Disp: 90 tablet, Rfl: 1   Family History   Problem Relation Age of Onset    Melanoma Mother     Depression Father           Vital Signs:   Vitals:    05/06/25 0810   BP: 116/74   BP Location: Right arm   Patient Position: Sitting   Cuff Size: Adult   Pulse: 78   Temp: 96.9 °F (36.1 °C)   TempSrc: Temporal   SpO2: 99%   Weight: 78.8 kg (173 lb 11.2 oz)   Height: 172.7 cm (68\")       Review of Systems   HENT:  Positive for congestion and sore throat. Negative for ear pain, rhinorrhea, sinus pain and sneezing.    Respiratory:  Negative for cough and wheezing.    Cardiovascular:  Negative for chest pain.   Gastrointestinal:  Negative for abdominal pain, diarrhea, nausea and vomiting.   Genitourinary:  Negative for dysuria.   Musculoskeletal:  Negative for joint pain and neck pain.   Skin:  Negative for rash.   Neurological:  Negative for headaches.      Physical Exam  Vitals reviewed.   Constitutional:       Appearance: Normal appearance. He is well-developed.   HENT:      Head: Normocephalic and atraumatic.      Right Ear: Tympanic membrane, ear canal and external ear normal.      Left Ear: Tympanic membrane, ear canal and external ear normal.      Nose: Nose normal.      Mouth/Throat:      Mouth: Mucous membranes are moist.      Pharynx: Oropharynx is clear. Posterior oropharyngeal erythema present. No oropharyngeal exudate.   Eyes:      Conjunctiva/sclera: Conjunctivae normal.      Pupils: Pupils are equal, round, and reactive to light.   Cardiovascular:      Rate and Rhythm: Normal rate and regular rhythm.      Pulses: Normal pulses.      Heart sounds: Normal heart sounds. No murmur heard.     No friction rub. No gallop.   Pulmonary:      Effort: Pulmonary effort is normal.      Breath sounds: Normal breath sounds. No wheezing or " rhonchi.   Abdominal:      General: Abdomen is flat. Bowel sounds are normal. There is no distension.      Palpations: Abdomen is soft. There is no mass.      Tenderness: There is no abdominal tenderness. There is no guarding or rebound.      Hernia: No hernia is present.   Musculoskeletal:         General: Normal range of motion.      Cervical back: Normal range of motion and neck supple.   Skin:     General: Skin is warm and dry.      Capillary Refill: Capillary refill takes less than 2 seconds.   Neurological:      General: No focal deficit present.      Mental Status: He is alert and oriented to person, place, and time.      Cranial Nerves: No cranial nerve deficit.   Psychiatric:         Mood and Affect: Mood and affect normal.         Behavior: Behavior normal.         Thought Content: Thought content normal.         Judgment: Judgment normal.        Result Review :                 Assessment and Plan    Diagnoses and all orders for this visit:    1. Fatigue, unspecified type (Primary)  -     POCT SARS-CoV-2 Antigen GABE + Flu    2. Upper respiratory tract infection, unspecified type    3. Pharyngitis, unspecified etiology    Cont otc tx, pt refuses any prescriptions at this time        Follow Up   Return if symptoms worsen or fail to improve.  Patient was given instructions and counseling regarding his condition or for health maintenance advice. Please see specific information pulled into the AVS if appropriate.        no

## 2025-07-03 NOTE — HISTORY OF PRESENT ILLNESS
Script sent for metronidazole.  Please inform pt.  Ansley Olivo M.D.     [de-identified] : Patient Name: PONCE VIRGEN \par MRN: [id number] \par Referring Provider: CHRIS GILMORE MD \par Oncologist: Dr. Gilmore\par Date: 03/28/2022 \par \par Diagnosis: liver lesion in the setting of follicular lymphoma\par \par 66 year male  presents for evaluation of a liver cyst.\par He has a PMH of Stage III, grade 1 follicular lymphoma. He was previously followed by Dr. Carlos and is now followed by Dr. Gilmore.\par He was diagnosed with lymphoma in 2012 when a CT showed a mass in the pancreas, peritoneal LAD, and a cyst in the liver. There was concern for metastatic pancreatic malignancy.\par He was loss to follow from [id number].\par 10/3/2020 - CT AP was done at [facility] showing a 5.7cm mass in association with the body of the pancreas, adjacent adenopathy, retroperitoneal adenopathy, concern for pancreatic malignancy; metastatic. Also found to have an 8.7 cm cyst in the right lobe of the liver.\par 10/5/2020 - MRI done showing a heterogenous pancreas mass, retroperitoneal lymphadenopathy.\par 10/5/2020 - CA 19-9 is 22\par 10/29/2020 he saw Dr. Ciro Galeana and underwent an EUS/FNA. Abdominal mass FNA revealed atypical cells. Retroperitoneum shekhar pancreatic lesion FNA showed necrotic lymphoid infiltrate and fibrous tissue. Retroperitoneum shekhar pancreati clesion core biopsy showed dense necrotic lymphoid infiltrate.\par 3/10/2022 CT showing POD with increasing/new adenopathy, an enlarging liver cyst. \par \par Currently, Mr. VIRGEN denies abdominal pain and discomfort, admits to sometimes having decreased appetite. He does have constipation. He denies recent unintentional weight loss. He earl fevers, chills, or night sweats.\par \par Functional Status: Mr. VIRGEN is able to walk up 1 flight of stairs without fatigue or dyspnea.\par \par He [] genetic testing\par